# Patient Record
Sex: FEMALE | Race: BLACK OR AFRICAN AMERICAN | Employment: UNEMPLOYED | URBAN - METROPOLITAN AREA
[De-identification: names, ages, dates, MRNs, and addresses within clinical notes are randomized per-mention and may not be internally consistent; named-entity substitution may affect disease eponyms.]

---

## 2017-06-10 ENCOUNTER — HOSPITAL ENCOUNTER (EMERGENCY)
Age: 52
Discharge: HOME | End: 2017-06-11
Attending: EMERGENCY MEDICINE | Admitting: EMERGENCY MEDICINE
Payer: SELF-PAY

## 2017-06-10 ENCOUNTER — APPOINTMENT (OUTPATIENT)
Dept: CT IMAGING | Age: 52
End: 2017-06-10
Attending: EMERGENCY MEDICINE
Payer: SELF-PAY

## 2017-06-10 VITALS
OXYGEN SATURATION: 98 % | HEIGHT: 64 IN | SYSTOLIC BLOOD PRESSURE: 126 MMHG | DIASTOLIC BLOOD PRESSURE: 54 MMHG | RESPIRATION RATE: 16 BRPM | HEART RATE: 96 BPM | TEMPERATURE: 99.3 F | BODY MASS INDEX: 25.61 KG/M2 | WEIGHT: 150 LBS

## 2017-06-10 DIAGNOSIS — K52.9 ENTERITIS: Primary | ICD-10-CM

## 2017-06-10 LAB
ALBUMIN SERPL BCP-MCNC: 4.1 G/DL (ref 3.4–5)
ALBUMIN/GLOB SERPL: 1 {RATIO} (ref 0.8–1.7)
ALP SERPL-CCNC: 97 U/L (ref 45–117)
ALT SERPL-CCNC: 33 U/L (ref 13–56)
ANION GAP BLD CALC-SCNC: 7 MMOL/L (ref 3–18)
APPEARANCE UR: ABNORMAL
AST SERPL W P-5'-P-CCNC: 28 U/L (ref 15–37)
BACTERIA URNS QL MICRO: ABNORMAL /HPF
BASOPHILS # BLD AUTO: 0 K/UL (ref 0–0.06)
BASOPHILS # BLD: 0 % (ref 0–2)
BILIRUB SERPL-MCNC: 0.3 MG/DL (ref 0.2–1)
BILIRUB UR QL: ABNORMAL
BUN SERPL-MCNC: 16 MG/DL (ref 7–18)
BUN/CREAT SERPL: 16 (ref 12–20)
CALCIUM SERPL-MCNC: 9.6 MG/DL (ref 8.5–10.1)
CHLORIDE SERPL-SCNC: 102 MMOL/L (ref 100–108)
CK MB CFR SERPL CALC: NORMAL % (ref 0–4)
CK MB SERPL-MCNC: <1 NG/ML (ref 5–25)
CK SERPL-CCNC: 60 U/L (ref 26–192)
CO2 SERPL-SCNC: 27 MMOL/L (ref 21–32)
COLOR UR: ABNORMAL
CREAT SERPL-MCNC: 0.98 MG/DL (ref 0.6–1.3)
DIFFERENTIAL METHOD BLD: ABNORMAL
EOSINOPHIL # BLD: 0 K/UL (ref 0–0.4)
EOSINOPHIL NFR BLD: 0 % (ref 0–5)
EPITH CASTS URNS QL MICRO: ABNORMAL /LPF (ref 0–5)
ERYTHROCYTE [DISTWIDTH] IN BLOOD BY AUTOMATED COUNT: 14 % (ref 11.6–14.5)
GLOBULIN SER CALC-MCNC: 4.3 G/DL (ref 2–4)
GLUCOSE SERPL-MCNC: 121 MG/DL (ref 74–99)
GLUCOSE UR STRIP.AUTO-MCNC: NEGATIVE MG/DL
HCG UR QL: NEGATIVE
HCT VFR BLD AUTO: 43.3 % (ref 35–45)
HGB BLD-MCNC: 14.1 G/DL (ref 12–16)
HGB UR QL STRIP: ABNORMAL
HYALINE CASTS URNS QL MICRO: ABNORMAL /LPF (ref 0–2)
KETONES UR QL STRIP.AUTO: 15 MG/DL
LEUKOCYTE ESTERASE UR QL STRIP.AUTO: ABNORMAL
LIPASE SERPL-CCNC: 97 U/L (ref 73–393)
LYMPHOCYTES # BLD AUTO: 7 % (ref 21–52)
LYMPHOCYTES # BLD: 0.4 K/UL (ref 0.9–3.6)
MCH RBC QN AUTO: 29.1 PG (ref 24–34)
MCHC RBC AUTO-ENTMCNC: 32.6 G/DL (ref 31–37)
MCV RBC AUTO: 89.3 FL (ref 74–97)
MONOCYTES # BLD: 0.2 K/UL (ref 0.05–1.2)
MONOCYTES NFR BLD AUTO: 4 % (ref 3–10)
MUCOUS THREADS URNS QL MICRO: ABNORMAL /LPF
NEUTS SEG # BLD: 5.1 K/UL (ref 1.8–8)
NEUTS SEG NFR BLD AUTO: 89 % (ref 40–73)
NITRITE UR QL STRIP.AUTO: NEGATIVE
PH UR STRIP: 5 [PH] (ref 5–8)
PLATELET # BLD AUTO: 247 K/UL (ref 135–420)
PMV BLD AUTO: 10.8 FL (ref 9.2–11.8)
POTASSIUM SERPL-SCNC: 3.8 MMOL/L (ref 3.5–5.5)
PROT SERPL-MCNC: 8.4 G/DL (ref 6.4–8.2)
PROT UR STRIP-MCNC: 30 MG/DL
RBC # BLD AUTO: 4.85 M/UL (ref 4.2–5.3)
RBC #/AREA URNS HPF: ABNORMAL /HPF (ref 0–5)
SODIUM SERPL-SCNC: 136 MMOL/L (ref 136–145)
SP GR UR REFRACTOMETRY: >1.03 (ref 1–1.03)
TROPONIN I SERPL-MCNC: <0.02 NG/ML (ref 0–0.06)
UROBILINOGEN UR QL STRIP.AUTO: 1 EU/DL (ref 0.2–1)
WBC # BLD AUTO: 5.7 K/UL (ref 4.6–13.2)
WBC URNS QL MICRO: ABNORMAL /HPF (ref 0–4)

## 2017-06-10 PROCEDURE — 74177 CT ABD & PELVIS W/CONTRAST: CPT

## 2017-06-10 PROCEDURE — 96372 THER/PROPH/DIAG INJ SC/IM: CPT

## 2017-06-10 PROCEDURE — 99284 EMERGENCY DEPT VISIT MOD MDM: CPT

## 2017-06-10 PROCEDURE — 81001 URINALYSIS AUTO W/SCOPE: CPT | Performed by: NURSE PRACTITIONER

## 2017-06-10 PROCEDURE — 85025 COMPLETE CBC W/AUTO DIFF WBC: CPT | Performed by: NURSE PRACTITIONER

## 2017-06-10 PROCEDURE — 96376 TX/PRO/DX INJ SAME DRUG ADON: CPT

## 2017-06-10 PROCEDURE — 82550 ASSAY OF CK (CPK): CPT | Performed by: NURSE PRACTITIONER

## 2017-06-10 PROCEDURE — 80053 COMPREHEN METABOLIC PANEL: CPT | Performed by: NURSE PRACTITIONER

## 2017-06-10 PROCEDURE — 74011636320 HC RX REV CODE- 636/320: Performed by: EMERGENCY MEDICINE

## 2017-06-10 PROCEDURE — 96361 HYDRATE IV INFUSION ADD-ON: CPT

## 2017-06-10 PROCEDURE — 83690 ASSAY OF LIPASE: CPT | Performed by: NURSE PRACTITIONER

## 2017-06-10 PROCEDURE — 81025 URINE PREGNANCY TEST: CPT | Performed by: EMERGENCY MEDICINE

## 2017-06-10 PROCEDURE — 96374 THER/PROPH/DIAG INJ IV PUSH: CPT

## 2017-06-10 PROCEDURE — 74011250636 HC RX REV CODE- 250/636: Performed by: EMERGENCY MEDICINE

## 2017-06-10 PROCEDURE — 93005 ELECTROCARDIOGRAM TRACING: CPT

## 2017-06-10 RX ORDER — DICYCLOMINE HYDROCHLORIDE 20 MG/1
20 TABLET ORAL EVERY 6 HOURS
Qty: 20 TAB | Refills: 0 | Status: SHIPPED | OUTPATIENT
Start: 2017-06-10 | End: 2017-06-15

## 2017-06-10 RX ORDER — ONDANSETRON 2 MG/ML
4 INJECTION INTRAMUSCULAR; INTRAVENOUS
Status: COMPLETED | OUTPATIENT
Start: 2017-06-10 | End: 2017-06-10

## 2017-06-10 RX ORDER — ONDANSETRON 2 MG/ML
4 INJECTION INTRAMUSCULAR; INTRAVENOUS
Status: DISCONTINUED | OUTPATIENT
Start: 2017-06-10 | End: 2017-06-10 | Stop reason: SDUPTHER

## 2017-06-10 RX ORDER — ONDANSETRON 4 MG/1
4 TABLET, FILM COATED ORAL
Qty: 12 TAB | Refills: 0 | Status: SHIPPED | OUTPATIENT
Start: 2017-06-10 | End: 2018-05-07

## 2017-06-10 RX ORDER — DICYCLOMINE HYDROCHLORIDE 10 MG/ML
20 INJECTION INTRAMUSCULAR
Status: COMPLETED | OUTPATIENT
Start: 2017-06-10 | End: 2017-06-10

## 2017-06-10 RX ADMIN — DICYCLOMINE HYDROCHLORIDE 20 MG: 20 INJECTION, SOLUTION INTRAMUSCULAR at 20:24

## 2017-06-10 RX ADMIN — IOPAMIDOL 100 ML: 612 INJECTION, SOLUTION INTRAVENOUS at 22:54

## 2017-06-10 RX ADMIN — ONDANSETRON 4 MG: 2 INJECTION INTRAMUSCULAR; INTRAVENOUS at 20:21

## 2017-06-10 RX ADMIN — SODIUM CHLORIDE 1000 ML: 900 INJECTION, SOLUTION INTRAVENOUS at 22:41

## 2017-06-10 RX ADMIN — ONDANSETRON 4 MG: 2 INJECTION INTRAMUSCULAR; INTRAVENOUS at 23:17

## 2017-06-10 RX ADMIN — SODIUM CHLORIDE 1000 ML: 900 INJECTION, SOLUTION INTRAVENOUS at 20:02

## 2017-06-10 NOTE — ED TRIAGE NOTES
Pt presents to the ED with diarrhea and vomiting onset x1 week. Pt reports black watery stools. Pt states feeling dehydrated. Pt reports increased stress, recent death of a son in November, and death of a parent in December. Pt reports bilateral arm weakness, states chest pain, chills, and \"hot flashes. \"

## 2017-06-10 NOTE — ED PROVIDER NOTES
HPI Comments: 7:34 PM Mahamed Hearn is a 45 y.o. female who presents to the ED for the evaluation of  N/V/D. She also reports generalized myalgias, chills, subjective fever, and intermittent fever for the past week. Pt reports occasional drinking, but no smoking or drug use. Pt denies any new food intake. Denies recent sick contacts. No relieving or exacerbating factors. No other complaints at this time. PMHx: Anemia; Back pain, chronic; Hematuria, microscopic; Kidney stone; and UTI (urinary tract infection). She also has no past medical history of Kidney disease. PCP: Trini Pritchett MD       The history is provided by the patient. Past Medical History:   Diagnosis Date    Anemia     Back pain, chronic     Hematuria, microscopic     Kidney stone     UTI (urinary tract infection)     had one late 2013       Past Surgical History:   Procedure Laterality Date    ENDOMETRIAL CRYOABLATION      HX TONSIL AND ADENOIDECTOMY  1968         Family History:   Problem Relation Age of Onset    Diabetes Mother     Stroke Mother     Hypertension Mother     Diabetes Father     Stroke Father     Hypertension Father     Diabetes Maternal Grandmother     Huntingtons disease Maternal Grandmother     Diabetes Maternal Grandfather     Huntingtons disease Maternal Grandfather        Social History     Social History    Marital status:      Spouse name: N/A    Number of children: N/A    Years of education: N/A     Occupational History    Not on file. Social History Main Topics    Smoking status: Never Smoker    Smokeless tobacco: Not on file    Alcohol use 0.5 oz/week     1 Cans of beer per week      Comment: occ    Drug use: No    Sexual activity: Not on file     Other Topics Concern    Not on file     Social History Narrative    ** Merged History Encounter **              ALLERGIES: Review of patient's allergies indicates no known allergies.     Review of Systems   Constitutional: Positive for appetite change, chills and fever. HENT: Negative. Eyes: Negative. Respiratory: Negative. Cardiovascular: Positive for chest pain. Gastrointestinal: Positive for nausea and vomiting. Endocrine: Negative. Genitourinary: Negative. Musculoskeletal: Positive for myalgias. Skin: Negative. Allergic/Immunologic: Negative. Neurological: Negative. Hematological: Negative. Psychiatric/Behavioral: Negative. All other systems reviewed and are negative. Vitals:    06/10/17 1600 06/10/17 2125 06/10/17 2200 06/10/17 2303   BP: 108/64 103/65  126/54   Pulse: (!) 112 (!) 112  96   Resp: 18 16  16   Temp: 99.1 °F (37.3 °C) 100.4 °F (38 °C)     SpO2: 97% 97% 97% 97%   Weight: 68 kg (150 lb)      Height: 5' 4\" (1.626 m)           97% on RA, indicating adequate oxygenation. Physical Exam   Constitutional: She is oriented to person, place, and time. She appears well-developed and well-nourished. No distress. HENT:   Head: Normocephalic. Right Ear: External ear normal.   Left Ear: External ear normal.   Mouth/Throat: No oropharyngeal exudate. Eyes: Conjunctivae and EOM are normal. Pupils are equal, round, and reactive to light. Right eye exhibits no discharge. Left eye exhibits no discharge. No scleral icterus. Neck: Normal range of motion. Neck supple. No JVD present. No tracheal deviation present. No thyromegaly present. Cardiovascular: Normal rate, regular rhythm, normal heart sounds and intact distal pulses. Exam reveals no gallop and no friction rub. No murmur heard. Pulmonary/Chest: Effort normal and breath sounds normal. No stridor. No respiratory distress. She has no wheezes. She has no rales. She exhibits no tenderness. Abdominal: Soft. Bowel sounds are normal. She exhibits no distension and no mass. There is no tenderness. There is no rebound and no guarding. Musculoskeletal: Normal range of motion. She exhibits no edema or tenderness. Lymphadenopathy:     She has no cervical adenopathy. Neurological: She is alert and oriented to person, place, and time. She displays normal reflexes. No cranial nerve deficit. She exhibits normal muscle tone. Coordination normal.   Skin: Skin is warm and dry. No rash noted. She is not diaphoretic. No erythema. No pallor. Nursing note and vitals reviewed.        MDM  Number of Diagnoses or Management Options  Enteritis:      Amount and/or Complexity of Data Reviewed  Clinical lab tests: ordered and reviewed    Risk of Complications, Morbidity, and/or Mortality  Presenting problems: moderate  Diagnostic procedures: moderate  Management options: moderate      ED Course       Procedures    Medications ordered:   Medications   ondansetron (ZOFRAN) injection 4 mg (not administered)   sodium chloride 0.9 % bolus infusion 1,000 mL (0 mL IntraVENous IV Completed 6/10/17 2102)   ondansetron (ZOFRAN) injection 4 mg (4 mg IntraVENous Given 6/10/17 2021)   dicyclomine (BENTYL) 10 mg/mL injection 20 mg (20 mg IntraMUSCular Given 6/10/17 2024)   sodium chloride 0.9 % bolus infusion 1,000 mL (1,000 mL IntraVENous New Bag 6/10/17 2241)   iopamidol (ISOVUE 300) 61 % contrast injection  mL (100 mL IntraVENous Given 6/10/17 2254)   ondansetron (ZOFRAN) injection 4 mg (4 mg IntraVENous Given 6/10/17 2317)         Lab findings:  Recent Results (from the past 12 hour(s))   EKG, 12 LEAD, INITIAL    Collection Time: 06/10/17  3:30 PM   Result Value Ref Range    Ventricular Rate 103 BPM    Atrial Rate 103 BPM    P-R Interval 120 ms    QRS Duration 88 ms    Q-T Interval 332 ms    QTC Calculation (Bezet) 434 ms    Calculated P Axis 30 degrees    Calculated R Axis 49 degrees    Calculated T Axis 22 degrees    Diagnosis       Sinus tachycardia  Otherwise normal ECG  No previous ECGs available     CBC WITH AUTOMATED DIFF    Collection Time: 06/10/17  7:35 PM   Result Value Ref Range    WBC 5.7 4.6 - 13.2 K/uL    RBC 4.85 4.20 - 5.30 M/uL    HGB 14.1 12.0 - 16.0 g/dL    HCT 43.3 35.0 - 45.0 %    MCV 89.3 74.0 - 97.0 FL    MCH 29.1 24.0 - 34.0 PG    MCHC 32.6 31.0 - 37.0 g/dL    RDW 14.0 11.6 - 14.5 %    PLATELET 022 273 - 431 K/uL    MPV 10.8 9.2 - 11.8 FL    NEUTROPHILS 89 (H) 40 - 73 %    LYMPHOCYTES 7 (L) 21 - 52 %    MONOCYTES 4 3 - 10 %    EOSINOPHILS 0 0 - 5 %    BASOPHILS 0 0 - 2 %    ABS. NEUTROPHILS 5.1 1.8 - 8.0 K/UL    ABS. LYMPHOCYTES 0.4 (L) 0.9 - 3.6 K/UL    ABS. MONOCYTES 0.2 0.05 - 1.2 K/UL    ABS. EOSINOPHILS 0.0 0.0 - 0.4 K/UL    ABS. BASOPHILS 0.0 0.0 - 0.06 K/UL    DF AUTOMATED     METABOLIC PANEL, COMPREHENSIVE    Collection Time: 06/10/17  7:35 PM   Result Value Ref Range    Sodium 136 136 - 145 mmol/L    Potassium 3.8 3.5 - 5.5 mmol/L    Chloride 102 100 - 108 mmol/L    CO2 27 21 - 32 mmol/L    Anion gap 7 3.0 - 18 mmol/L    Glucose 121 (H) 74 - 99 mg/dL    BUN 16 7.0 - 18 MG/DL    Creatinine 0.98 0.6 - 1.3 MG/DL    BUN/Creatinine ratio 16 12 - 20      GFR est AA >60 >60 ml/min/1.73m2    GFR est non-AA 60 (L) >60 ml/min/1.73m2    Calcium 9.6 8.5 - 10.1 MG/DL    Bilirubin, total 0.3 0.2 - 1.0 MG/DL    ALT (SGPT) 33 13 - 56 U/L    AST (SGOT) 28 15 - 37 U/L    Alk.  phosphatase 97 45 - 117 U/L    Protein, total 8.4 (H) 6.4 - 8.2 g/dL    Albumin 4.1 3.4 - 5.0 g/dL    Globulin 4.3 (H) 2.0 - 4.0 g/dL    A-G Ratio 1.0 0.8 - 1.7     LIPASE    Collection Time: 06/10/17  7:35 PM   Result Value Ref Range    Lipase 97 73 - 393 U/L   CARDIAC PANEL,(CK, CKMB & TROPONIN)    Collection Time: 06/10/17  7:35 PM   Result Value Ref Range    CK 60 26 - 192 U/L    CK - MB <1.0 <3.6 ng/ml    CK-MB Index Cannot be calulated 0.0 - 4.0 %    Troponin-I, Qt. <0.02 0.00 - 0.06 NG/ML   URINALYSIS W/ RFLX MICROSCOPIC    Collection Time: 06/10/17  7:35 PM   Result Value Ref Range    Color DARK YELLOW      Appearance CLOUDY      Specific gravity >1.030 (H) 1.005 - 1.030    pH (UA) 5.0 5.0 - 8.0      Protein 30 (A) NEG mg/dL    Glucose NEGATIVE  NEG mg/dL Ketone 15 (A) NEG mg/dL    Bilirubin MODERATE (A) NEG      Blood TRACE (A) NEG      Urobilinogen 1.0 0.2 - 1.0 EU/dL    Nitrites NEGATIVE  NEG      Leukocyte Esterase TRACE (A) NEG     URINE MICROSCOPIC ONLY    Collection Time: 06/10/17  7:35 PM   Result Value Ref Range    WBC 0 to 3 0 - 4 /hpf    RBC 0 to 3 0 - 5 /hpf    Epithelial cells 1+ 0 - 5 /lpf    Bacteria FEW (A) NEG /hpf    Mucus 2+ (A) NEG /lpf    Hyaline cast 11 to 20 0 - 2 /lpf   HCG URINE, QL    Collection Time: 06/10/17  7:35 PM   Result Value Ref Range    HCG urine, Ql. NEGATIVE  NEG          EKG interpretation per Drenda Castleman, MD :  Sinus tachycardia rate of 103. o STEMI    X-Ray, CT or other radiology findings or impressions:  Ct Abd Pelv W Cont    Result Date: 6/10/2017  CT ABDOMEN/PELVIS WITH CONTRAST CPT CODE: 31068,05552 HISTORY: Diarrhea and vomiting for one week. COMPARISON: None. TECHNIQUE: 5 mm helical scans were obtained of the abdomen and pelvis after uneventful administration of intravenous contrast. 100 cc of Isovue-300 was given. Coronal and sagittal reformations. CT dose reduction was achieved through use of a standardized protocol tailored for this examination and automatic exposure control for dose modulation. Vernida Chard FINDINGS: The visualized lung bases are clear. Low-attenuation focus at the central left liver on image 14 measures 1.2 x 0.6 cm. No other focal liver lesion identified. Homogeneous enhancement of the spleen. Homogeneous enhancement of the pancreas. Gallbladder is nondilated. No biliary duct dilatation. .  No adrenal nodules or masses. The kidneys enhance symmetrically. Hypodensity in the posterior mid right kidney medially measures 4.7 mm, too small to definitively characterize but favored to be a cyst given its conspicuity. .  No hydronephrosis. There is no free intraperitoneal air, free fluid, or fluid collections. No abdominal or pelvic lymphadenopathy. Stomach is nondilated.  Fluid throughout the small bowel and the right and transverse colon. No wall thickening. No pneumatosis. No portal venous gas. No bowel dilatation. The appendix does not appear inflamed. The bladder is under distended and therefore incompletely evaluated. Uterus and ovaries appear within normal limits, no adnexal mass. No calcifications in the abdominal aorta. No aneurysmal dilatation. Osseous structures are intact. IMPRESSION: Fluid filled small bowel loops and proximal to mid right colon. No associated dilatation to suggest obstruction. Findings may represent a nonspecific enteritis and correlate with reported history of diarrhea. Low-attenuation focus in the left liver incompletely characterized on this study. Most likely incidental and benign particularly if there is no history of any known malignancy. Consider a outpatient ultrasound for correlation. Probable small cyst in the right kidney which also could be confirmed on ultrasound. Progress notes, Consult notes or additional Procedure notes:   11:39 PM: Rechecked patient. Updated patient on all ED findings. All questions answered. Reevaluation of patient:   I have reevaluated patient. Patient is feeling better    Dispo:  Patient was discharged home in stable condition. Patient is to return to emergency department with any new or worsening condition. Diagnosis:   1. Enteritis        Follow-up Information     Follow up With Details Comments Contact Info    Walter Kauffman MD Call in 2 days      [facility] EMERGENCY DEPT  As needed, If symptoms worsen [address] [facility]  [phone]           Patient's Medications   Start Taking    No medications on file   Continue Taking    CYCLOBENZAPRINE (FLEXERIL) 10 MG TABLET    Take  by mouth three (3) times daily as needed for Muscle Spasm(s). DIAZEPAM (VALIUM) 10 MG TABLET    Take 10 mg by mouth every six (6) hours as needed for Anxiety. HYDROCODONE BIT/ACETAMINOPHEN (VICODIN PO)    Take  by mouth. LORATADINE (CLARITIN) 10 MG TABLET    Take 10 mg by mouth daily. NAPROXEN (NAPROSYN) 500 MG TABLET    Take 500 mg by mouth two (2) times daily (with meals). TRAMADOL (ULTRAM) 50 MG TABLET    Take 50 mg by mouth every six (6) hours as needed for Pain. These Medications have changed    No medications on file   Stop Taking    No medications on file         SCRIBE ATTESTATION STATEMENT  Documented by: Beverley Berrios. [facility] for, and in the presence of, Chris Laureano MD 7:33 PM   Signed by Tabatha Sneed, 6/10/2017 7:33 PM     PROVIDER ATTESTATION STATEMENT  I personally performed the services described in the documentation, reviewed the documentation, as recorded by the scribe in my presence, and it accurately and completely records my words and actions.   Chris Laureano MD

## 2017-06-11 NOTE — ED NOTES
Pt resting comfortably in bed; calm/drowsy affect. Reports discomfort has decreased to 1/10 with no nausea. Elevated temperature noted with ongoing tachycardia; Dr Gray Ruiz notified. Education given to pt regarding importance of hydration/electrolyte replacement after watery stools, and clear liquids.

## 2017-06-12 LAB
ATRIAL RATE: 103 BPM
CALCULATED P AXIS, ECG09: 30 DEGREES
CALCULATED R AXIS, ECG10: 49 DEGREES
CALCULATED T AXIS, ECG11: 22 DEGREES
DIAGNOSIS, 93000: NORMAL
P-R INTERVAL, ECG05: 120 MS
Q-T INTERVAL, ECG07: 332 MS
QRS DURATION, ECG06: 88 MS
QTC CALCULATION (BEZET), ECG08: 434 MS
VENTRICULAR RATE, ECG03: 103 BPM

## 2018-05-07 ENCOUNTER — HOSPITAL ENCOUNTER (EMERGENCY)
Age: 53
Discharge: HOME | End: 2018-05-07
Attending: EMERGENCY MEDICINE
Payer: SELF-PAY

## 2018-05-07 VITALS
DIASTOLIC BLOOD PRESSURE: 79 MMHG | OXYGEN SATURATION: 100 % | BODY MASS INDEX: 27.46 KG/M2 | TEMPERATURE: 98.8 F | SYSTOLIC BLOOD PRESSURE: 126 MMHG | HEART RATE: 83 BPM | WEIGHT: 160 LBS | RESPIRATION RATE: 16 BRPM

## 2018-05-07 DIAGNOSIS — J02.9 SORE THROAT: Primary | ICD-10-CM

## 2018-05-07 PROCEDURE — 99282 EMERGENCY DEPT VISIT SF MDM: CPT

## 2018-05-07 PROCEDURE — 87081 CULTURE SCREEN ONLY: CPT | Performed by: PHYSICIAN ASSISTANT

## 2018-05-07 RX ORDER — FLUTICASONE PROPIONATE 50 MCG
2 SPRAY, SUSPENSION (ML) NASAL DAILY
Qty: 1 BOTTLE | Refills: 0 | Status: SHIPPED | OUTPATIENT
Start: 2018-05-07

## 2018-05-07 RX ORDER — LORATADINE AND PSEUDOEPHEDRINE 10; 240 MG/1; MG/1
1 TABLET, EXTENDED RELEASE ORAL DAILY
Qty: 10 TAB | Refills: 0 | Status: SHIPPED | OUTPATIENT
Start: 2018-05-07

## 2018-05-08 NOTE — ED PROVIDER NOTES
EMERGENCY DEPARTMENT HISTORY AND PHYSICAL EXAM    11:17 PM      Date: 5/7/2018  Patient Name: Selam Klein    History of Presenting Illness     Chief Complaint   Patient presents with    Sore Throat         History Provided By: Patient    Chief Complaint: sore throat   Duration: 3  Days  Timing:  Acute  Location:    Quality: Burning  Severity: Moderate  Modifying Factors:  Swallowing   Associated Symptoms: rhinorrhea and congestion       Additional History (Context): Selam Klein is a 47 y.o. female with No significant past medical history who presents with rhinorrhea, sore throat, congestion. [facility]CP: [facility]    Current Outpatient Prescriptions   Medication Sig Dispense Refill    loratadine-pseudoephedrine (CLARITIN-D 24 HOUR)  mg per tablet Take 1 Tab by mouth daily. 10 Tab 0    fluticasone (FLONASE) 50 mcg/actuation nasal spray 2 Sprays by Both Nostrils route daily. 1 Bottle 0    diazepam (VALIUM) 10 mg tablet Take 10 mg by mouth every six (6) hours as needed for Anxiety.          Past History     Past Medical History:  Past Medical History:   Diagnosis Date    Anemia     Back pain, chronic     Hematuria, microscopic     Kidney stone     UTI (urinary tract infection)     had one late 2013       Past Surgical History:  Past Surgical History:   Procedure Laterality Date    ENDOMETRIAL CRYOABLATION      HX TONSIL AND ADENOIDECTOMY  1968       Family History:  Family History   Problem Relation Age of Onset    Diabetes Mother     Stroke Mother     Hypertension Mother     Diabetes Father     Stroke Father     Hypertension Father     Diabetes Maternal Grandmother     Huntingtons disease Maternal Grandmother     Diabetes Maternal Grandfather     Huntingtons disease Maternal Grandfather        Social History:  Social History   Substance Use Topics    Smoking status: Never Smoker    Smokeless tobacco: None    Alcohol use 0.5 oz/week     1 Cans of beer per week      Comment: occ Allergies:  No Known Allergies      Review of Systems       Review of Systems   Constitutional: Negative for fever. HENT: Positive for congestion, rhinorrhea and sore throat. Negative for facial swelling. Eyes: Negative for visual disturbance. Respiratory: Negative for shortness of breath. Cardiovascular: Negative for chest pain. Gastrointestinal: Negative for abdominal pain. Genitourinary: Negative for dysuria. Musculoskeletal: Negative for neck pain. Skin: Negative for rash. Neurological: Negative for dizziness. Psychiatric/Behavioral: Negative for confusion. All other systems reviewed and are negative. Physical Exam     Visit Vitals    /79 (BP 1 Location: Left arm, BP Patient Position: At rest)    Pulse 83    Temp 98.8 °F (37.1 °C)    Resp 16    Wt 72.6 kg (160 lb)    SpO2 100%    BMI 27.46 kg/m2         Physical Exam   Constitutional: She is oriented to person, place, and time. She appears well-developed and well-nourished. No distress. HENT:   Head: Normocephalic and atraumatic. Right Ear: Tympanic membrane, external ear and ear canal normal.   Left Ear: Tympanic membrane, external ear and ear canal normal.   Nose: Nose normal. Right sinus exhibits no maxillary sinus tenderness and no frontal sinus tenderness. Left sinus exhibits no maxillary sinus tenderness and no frontal sinus tenderness. Mouth/Throat: Uvula is midline, oropharynx is clear and moist and mucous membranes are normal. No oropharyngeal exudate, posterior oropharyngeal edema, posterior oropharyngeal erythema or tonsillar abscesses. Eyes: Conjunctivae are normal.   Neck: Normal range of motion. Neck supple. Cardiovascular: Normal rate, regular rhythm and normal heart sounds. Pulmonary/Chest: Effort normal and breath sounds normal.   Abdominal: She exhibits no distension. Musculoskeletal: Normal range of motion. Lymphadenopathy:     She has no cervical adenopathy.    Neurological: She is alert and oriented to person, place, and time. Skin: Skin is warm and dry. She is not diaphoretic. Psychiatric: She has a normal mood and affect. Nursing note and vitals reviewed. Diagnostic Study Results     Labs -  Recent Results (from the past 12 hour(s))   STREP THROAT SCREEN    Collection Time: 05/07/18 10:24 PM   Result Value Ref Range    Special Requests: NO SPECIAL REQUESTS      Strep Screen NEGATIVE       Culture result: PENDING        Radiologic Studies -   No orders to display         Medical Decision Making   I am the first provider for this patient. I reviewed the vital signs, available nursing notes, past medical history, past surgical history, family history and social history. Vital Signs-Reviewed the patient's vital signs. Records Reviewed: Nursing Notes (Time of Review: 11:17 PM)    ED Course: Progress Notes, Reevaluation, and Consults:      Provider Notes (Medical Decision Making): MDM  Number of Diagnoses or Management Options  Sore throat:   Diagnosis management comments: Sore throat. ENT exam normal.  Strep negative. Likely PND. Discussed treatment plan, return precautions, symptomatic relief, and expected time to improvement. All questions answered. Patient is stable for discharge and outpatient management. Diagnosis     Clinical Impression:   1. Sore throat        Disposition:     Follow-up Information     Follow up With Details Comments [address] In 3 days If symptoms do not improve [address] [facility]. [phone]  [address] EMERGENCY DEPT  Immediately if symptoms worsen [address] [address]  [phone]           Patient's Medications   Start Taking    FLUTICASONE (FLONASE) 50 MCG/ACTUATION NASAL SPRAY    2 Sprays by Both Nostrils route daily. LORATADINE-PSEUDOEPHEDRINE (CLARITIN-D 24 HOUR)  MG PER TABLET    Take 1 Tab by mouth daily. Continue Taking    DIAZEPAM (VALIUM) 10 MG TABLET    Take 10 mg by mouth every six (6) hours as needed for Anxiety. These Medications have changed    No medications on file   Stop Taking    CYCLOBENZAPRINE (FLEXERIL) 10 MG TABLET    Take  by mouth three (3) times daily as needed for Muscle Spasm(s). HYDROCODONE BIT/ACETAMINOPHEN (VICODIN PO)    Take  by mouth. LORATADINE (CLARITIN) 10 MG TABLET    Take 10 mg by mouth daily. NAPROXEN (NAPROSYN) 500 MG TABLET    Take 500 mg by mouth two (2) times daily (with meals). ONDANSETRON HCL (ZOFRAN, AS HYDROCHLORIDE,) 4 MG TABLET    Take 1 Tab by mouth every eight (8) hours as needed for Nausea. TRAMADOL (ULTRAM) 50 MG TABLET    Take 50 mg by mouth every six (6) hours as needed for Pain.     _______________________________    Attestations:  Scribe Attestation     Liang KYLAH Day PA-C acting as a scribe for and in the presence of MARKO Villa      May 07, 2018 at 11:20 PM       Provider Attestation:      I personally performed the services described in the documentation, reviewed the documentation, as recorded by the scribe in my presence, and it accurately and completely records my words and actions.  May 07, 2018 at 11:20 PM - MARKO Villa  _______________________________

## 2018-05-08 NOTE — DISCHARGE INSTRUCTIONS
Sore Throat: Care Instructions  Your Care Instructions    Infection by bacteria or a virus causes most sore throats. Cigarette smoke, dry air, air pollution, allergies, and yelling can also cause a sore throat. Sore throats can be painful and annoying. Fortunately, most sore throats go away on their own. If you have a bacterial infection, your doctor may prescribe antibiotics. Follow-up care is a key part of your treatment and safety. Be sure to make and go to all appointments, and call your doctor if you are having problems. It's also a good idea to know your test results and keep a list of the medicines you take. How can you care for yourself at home? · If your doctor prescribed antibiotics, take them as directed. Do not stop taking them just because you feel better. You need to take the full course of antibiotics. · Gargle with warm salt water once an hour to help reduce swelling and relieve discomfort. Use 1 teaspoon of salt mixed in 1 cup of warm water. · Take an over-the-counter pain medicine, such as acetaminophen (Tylenol), ibuprofen (Advil, Motrin), or naproxen (Aleve). Read and follow all instructions on the label. · Be careful when taking over-the-counter cold or flu medicines and Tylenol at the same time. Many of these medicines have acetaminophen, which is Tylenol. Read the labels to make sure that you are not taking more than the recommended dose. Too much acetaminophen (Tylenol) can be harmful. · Drink plenty of fluids. Fluids may help soothe an irritated throat. Hot fluids, such as tea or soup, may help decrease throat pain. · Use over-the-counter throat lozenges to soothe pain. Regular cough drops or hard candy may also help. These should not be given to young children because of the risk of choking. · Do not smoke or allow others to smoke around you. If you need help quitting, talk to your doctor about stop-smoking programs and medicines.  These can increase your chances of quitting for good. · Use a vaporizer or humidifier to add moisture to your bedroom. Follow the directions for cleaning the machine. When should you call for help? Call your doctor now or seek immediate medical care if:  ? · You have new or worse trouble swallowing. ? · Your sore throat gets much worse on one side. ? Watch closely for changes in your health, and be sure to contact your doctor if you do not get better as expected. Where can you learn more? Go to http://kuldeep-isaiah.info/. Enter [id number] [id number] in the search box to learn more about \"Sore Throat: Care Instructions. \"  Current as of: May 12, 2017  Content Version: 11.4  © 9548-5076 [employer]. Care instructions adapted under license by [employer] (which disclaims liability or warranty for this information). If you have questions about a medical condition or this instruction, always ask your healthcare professional. [place] [address] any warranty or liability for your use of this information. Rapid Strep Test: About This Test  What is it? A rapid strep test checks the bacteria in your throat to see if strep is the cause of your sore throat. Why is this test done? It may be done so your doctor can find out right away whether you have strep throat. There is another test for strep, called a throat culture, but that test takes a few days to get the results. How can you prepare for the test?  You don't need to do anything before you have this test.  What happens during the test?  · You will be asked to tilt your head back and open your mouth as wide as possible. · Your doctor will press your tongue down with a flat stick (tongue depressor) and then examine your mouth and throat. · A clean cotton swab will be rubbed over the back of your throat, around your tonsils, and over any red areas or sores to collect a sample. How long does the test take? · The test takes less than a minute.   · Results are available in 10 to 15 minutes. Follow-up care is a key part of your treatment and safety. Be sure to make and go to all appointments, and call your doctor if you are having problems. It's also a good idea to keep a list of the medicines you take. Ask your doctor when you can expect to have your test results. Where can you learn more? Go to http://kuldeep-isaiah.info/. Enter B356 in the search box to learn more about \"Rapid Strep Test: About This Test.\"  Current as of: May 12, 2017  Content Version: 11.4  © 0028-9391 [employer]. Care instructions adapted under license by [employer] (which disclaims liability or warranty for this information). If you have questions about a medical condition or this instruction, always ask your healthcare professional. [address] any warranty or liability for your use of this information.

## 2018-05-10 LAB
B-HEM STREP THROAT QL CULT: NEGATIVE
BACTERIA SPEC CULT: NORMAL
SERVICE CMNT-IMP: NORMAL

## 2019-01-29 ENCOUNTER — OFFICE VISIT (OUTPATIENT)
Dept: FAMILY MEDICINE CLINIC | Age: 54
End: 2019-01-29

## 2019-01-29 VITALS
WEIGHT: 165 LBS | OXYGEN SATURATION: 95 % | BODY MASS INDEX: 28.17 KG/M2 | SYSTOLIC BLOOD PRESSURE: 114 MMHG | HEART RATE: 78 BPM | TEMPERATURE: 98.1 F | HEIGHT: 64 IN | DIASTOLIC BLOOD PRESSURE: 80 MMHG | RESPIRATION RATE: 12 BRPM

## 2019-01-29 DIAGNOSIS — M75.81 ROTATOR CUFF TENDONITIS, RIGHT: ICD-10-CM

## 2019-01-29 DIAGNOSIS — M25.521 ELBOW PAIN, RIGHT: Primary | ICD-10-CM

## 2019-01-29 DIAGNOSIS — M54.2 CHRONIC NECK PAIN: ICD-10-CM

## 2019-01-29 DIAGNOSIS — G89.29 CHRONIC NECK PAIN: ICD-10-CM

## 2019-01-29 RX ORDER — NAPROXEN 500 MG/1
500 TABLET ORAL 2 TIMES DAILY WITH MEALS
Qty: 60 TAB | Refills: 1 | Status: SHIPPED | OUTPATIENT
Start: 2019-01-29
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
## 2019-01-29 NOTE — PATIENT INSTRUCTIONS
Tennis Elbow: Exercises Your Care Instructions Here are some examples of typical rehabilitation exercises for your condition. Start each exercise slowly. Ease off the exercise if you start to have pain. Your doctor or physical therapist will tell you when you can start these exercises and which ones will work best for you. How to do the exercises Wrist flexor stretch 1. Extend your arm in front of you with your palm up. 2. Bend your wrist, pointing your hand toward the floor. 3. With your other hand, gently bend your wrist farther until you feel a mild to moderate stretch in your forearm. 4. Hold for at least 15 to 30 seconds. Repeat 2 to 4 times. Wrist extensor stretch 1. Repeat steps 1 to 4 of the stretch above but begin with your extended hand palm down. Ball or sock squeeze 1. Hold a tennis ball (or a rolled-up sock) in your hand. 2. Make a fist around the ball (or sock) and squeeze. 3. Hold for about 6 seconds, and then relax for up to 10 seconds. 4. Repeat 8 to 12 times. 5. Switch the ball (or sock) to your other hand and do 8 to 12 times. Wrist deviation 1. Sit so that your arm is supported but your hand hangs off the edge of a flat surface, such as a table. 2. Hold your hand out like you are shaking hands with someone. 3. Move your hand up and down. 4. Repeat this motion 8 to 12 times. 5. Switch arms. 6. Try to do this exercise twice with each hand. Wrist curls 1. Place your forearm on a table with your hand hanging over the edge of the table, palm up. 2. Place a 1- to 2-pound weight in your hand. This may be a dumbbell, a can of food, or a filled water bottle. 3. Slowly raise and lower the weight while keeping your forearm on the table and your palm facing up. 4. Repeat this motion 8 to 12 times. 5. Switch arms, and do steps 1 through 4. 
6. Repeat with your hand facing down toward the floor. Switch arms. Biceps curls 1. Sit leaning forward with your legs slightly spread and your left hand on your left thigh. 2. Place your right elbow on your right thigh, and hold the weight with your forearm horizontal. 
3. Slowly curl the weight up and toward your chest. 
4. Repeat this motion 8 to 12 times. 5. Switch arms, and do steps 1 through 4. Follow-up care is a key part of your treatment and safety. Be sure to make and go to all appointments, and call your doctor if you are having problems. It's also a good idea to know your test results and keep a list of the medicines you take. Where can you learn more? Go to http://kuldeep-isaiah.info/. Enter Q955 in the search box to learn more about \"Tennis Elbow: Exercises. \" Current as of: September 20, 2018 Content Version: 11.9 © 1593-6707 [employer]. Care instructions adapted under license by [employer] (which disclaims liability or warranty for this information). If you have questions about a medical condition or this instruction, always ask your healthcare professional. [address] any warranty or liability for your use of this information. Rotator Cuff: Exercises Your Care Instructions Here are some examples of typical rehabilitation exercises for your condition. Start each exercise slowly. Ease off the exercise if you start to have pain. Your doctor or physical therapist will tell you when you can start these exercises and which ones will work best for you. How to do the exercises Pendulum swing 1. Hold on to a table or the back of a chair with your good arm. Then bend forward a little and let your sore arm hang straight down. This exercise does not use the arm muscles. Rather, use your legs and your hips to create movement that makes your arm swing freely.  
2. Use the movement from your hips and legs to guide the slightly swinging arm back and forth like a pendulum (or elephant trunk). Then guide it in circles that start small (about the size of a dinner plate). Make the circles a bit larger each day, as your pain allows. 3. Do this exercise for 5 minutes, 5 to 7 times each day. 4. As you have less pain, try bending over a little farther to do this exercise. This will increase the amount of movement at your shoulder. Posterior stretching exercise 1. Hold the elbow of your injured arm with your other hand. 2. Use your hand to pull your injured arm gently up and across your body. You will feel a gentle stretch across the back of your injured shoulder. 3. Hold for at least 15 to 30 seconds. Then slowly lower your arm. 4. Repeat 2 to 4 times. Up-the-back stretch 1. Put your hand in your back pocket. Let it rest there to stretch your shoulder. 2. With your other hand, hold your injured arm (palm outward) behind your back by the wrist. Pull your arm up gently to stretch your shoulder. 3. Next, put a towel over your other shoulder. Put the hand of your injured arm behind your back. Now hold the back end of the towel. With the other hand, hold the front end of the towel in front of your body. Pull gently on the front end of the towel. This will bring your hand farther up your back to stretch your shoulder. Overhead stretch 1. Standing about an arm's length away, grasp onto a solid surface. You could use a countertop, a doorknob, or the back of a sturdy chair. 2. With your knees slightly bent, bend forward with your arms straight. Lower your upper body, and let your shoulders stretch. 3. As your shoulders are able to stretch farther, you may need to take a step or two backward. 4. Hold for at least 15 to 30 seconds. Then stand up and relax. If you had stepped back during your stretch, step forward so you can keep your hands on the solid surface. 5. Repeat 2 to 4 times. Shoulder flexion (lying down) 1. Lie on your back, holding a wand with both hands. Your palms should face down as you hold the wand. 2. Keeping your elbows straight, slowly raise your arms over your head. Raise them until you feel a stretch in your shoulders, upper back, and chest. 
3. Hold for 15 to 30 seconds. 4. Repeat 2 to 4 times. Shoulder rotation (lying down) 1. Lie on your back. Hold a wand with both hands with your elbows bent and palms up. 2. Keep your elbows close to your body, and move the wand across your body toward the sore arm. 3. Hold for 8 to 12 seconds. 4. Repeat 2 to 4 times. Wall climbing (to the side) 1. Stand with your side to a wall so that your fingers can just touch it at an angle about 30 degrees toward the front of your body. 2. Walk the fingers of your injured arm up the wall as high as pain permits. Try not to shrug your shoulder up toward your ear as you move your arm up. 3. Hold that position for a count of at least 15 to 20. 
4. Walk your fingers back down to the starting position. 5. Repeat at least 2 to 4 times. Try to reach higher each time. Wall climbing (to the front) 1. Face a wall, and stand so your fingers can just touch it. 2. Keeping your shoulder down, walk the fingers of your injured arm up the wall as high as pain permits. (Don't shrug your shoulder up toward your ear.) 3. Hold your arm in that position for at least 15 to 30 seconds. 4. Slowly walk your fingers back down to where you started. 5. Repeat at least 2 to 4 times. Try to reach higher each time. Shoulder blade squeeze 1. Stand with your arms at your sides, and squeeze your shoulder blades together. Do not raise your shoulders up as you squeeze. 2. Hold 6 seconds. 3. Repeat 8 to 12 times. Scapular exercise: Arm reach 1. Lie flat on your back. This exercise is a very slight motion that starts with your arms raised (elbows straight, arms straight). 2. From this position, reach higher toward the willi or ceiling. Keep your elbows straight. All motion should be from your shoulder blade only. 3. Relax your arms back to where you started. 4. Repeat 8 to 12 times. Arm raise to the side 1. Slowly raise your injured arm to the side, with your thumb facing up. Raise your arm 60 degrees at the most (shoulder level is 90 degrees). 2. Hold the position for 3 to 5 seconds. Then lower your arm back to your side. If you need to, bring your \"good\" arm across your body and place it under the elbow as you lower your injured arm. Use your good arm to keep your injured arm from dropping down too fast. 
3. Repeat 8 to 12 times. 4. When you first start out, don't hold any extra weight in your hand. As you get stronger, you may use a 1-pound to 2-pound dumbbell or a small can of food. Shoulder flexor and extensor exercise 1. Push forward (flex): Stand facing a wall or doorjamb, about 6 inches or less back. Hold your injured arm against your body. Make a closed fist with your thumb on top. Then gently push your hand forward into the wall with about 25% to 50% of your strength. Don't let your body move backward as you push. Hold for about 6 seconds. Relax for a few seconds. Repeat 8 to 12 times. 2. Push backward (extend): Stand with your back flat against a wall. Your upper arm should be against the wall, with your elbow bent 90 degrees (your hand straight ahead). Push your elbow gently back against the wall with about 25% to 50% of your strength. Don't let your body move forward as you push. Hold for about 6 seconds. Relax for a few seconds. Repeat 8 to 12 times. Scapular exercise: Wall push-ups 1. Stand facing a wall, about 12 inches to 18 inches away. 2. Place your hands on the wall at shoulder height. 3. Slowly bend your elbows and bring your face to the wall. Keep your back and hips straight. 4. Push back to where you started. 5. Repeat 8 to 12 times. 6. When you can do this exercise against a wall comfortably, you can try it against a counter. You can then slowly progress to the end of a couch, then to a sturdy chair, and finally to the floor. Scapular exercise: Retraction 1. Put the band around a solid object at about waist level. (A bedpost will work well.) Each hand should hold an end of the band. 2. With your elbows at your sides and bent to 90 degrees, pull the band back. Your shoulder blades should move toward each other. Then move your arms back where you started. 3. Repeat 8 to 12 times. 4. If you have good range of motion in your shoulders, try this exercise with your arms lifted out to the sides. Keep your elbows at a 90-degree angle. Raise the elastic band up to about shoulder level. Pull the band back to move your shoulder blades toward each other. Then move your arms back where you started. Internal rotator strengthening exercise 1. Start by tying a piece of elastic exercise material to a doorknob. You can use surgical tubing or Thera-Band. 2. Stand or sit with your shoulder relaxed and your elbow bent 90 degrees. Your upper arm should rest comfortably against your side. Squeeze a rolled towel between your elbow and your body for comfort. This will help keep your arm at your side. 3. Hold one end of the elastic band in the hand of the painful arm. 4. Slowly rotate your forearm toward your body until it touches your belly. Slowly move it back to where you started. 5. Keep your elbow and upper arm firmly tucked against the towel roll or at your side. 6. Repeat 8 to 12 times. External rotator strengthening exercise 1. Start by tying a piece of elastic exercise material to a doorknob. You can use surgical tubing or Thera-Band. (You may also hold one end of the band in each hand.) 2. Stand or sit with your shoulder relaxed and your elbow bent 90 degrees. Your upper arm should rest comfortably against your side.  Squeeze a rolled towel between your elbow and your body for comfort. This will help keep your arm at your side. 3. Hold one end of the elastic band with the hand of the painful arm. 4. Start with your forearm across your belly. Slowly rotate the forearm out away from your body. Keep your elbow and upper arm tucked against the towel roll or the side of your body until you begin to feel tightness in your shoulder. Slowly move your arm back to where you started. 5. Repeat 8 to 12 times. Follow-up care is a key part of your treatment and safety. Be sure to make and go to all appointments, and call your doctor if you are having problems. It's also a good idea to know your test results and keep a list of the medicines you take. Where can you learn more? Go to http://kuldeep-isaiah.info/. Enter Sena Willis in the search box to learn more about \"Rotator Cuff: Exercises. \" Current as of: September 20, 2018 Content Version: 11.9 © 8771-2429 [employer]. Care instructions adapted under license by [employer] (which disclaims liability or warranty for this information). If you have questions about a medical condition or this instruction, always ask your healthcare professional. [place] [address] any warranty or liability for your use of this information. As needed  You may schedule with: [facility] [address] [place] [address] [phone] [facility] [place] [address] Phone : [phone] Please complete BS Financial Assistance Form Well Woman Exam 2/5/19 @12:45PM

## 2019-01-29 NOTE — PROGRESS NOTES
Chief Complaint Patient presents with  Hypertension 1. When and where did you last receive medical care? Yes Where: [facility] 2. When and where did you last have preventive care such as mammogram, pap smears or colon screening?no 3. What is your current living situation (for example, live alone, live in home with immediate family members)? yes 4. Do you have any problems with communication such trouble seeing, hearing, or understanding instructions? No 
 
5. Do you have an advance directive? This is a document that you can give to family members with instructions for how you would want them to make health care decisions for you if you were unable to speak for yourself. (For example, unconscious, delerious)No 
 
PMH/FH/Social Hx reviewed and updated as needed Applicable screenings reviewed and updated as needed Medication reconciliation performed. Patient does not need medication refills. Health Maintenance reviewed.

## 2019-01-29 NOTE — PROGRESS NOTES
Patient scheduled for Well Woman Exam- Patient to enroll in [facility] - understands MAMMO means a  must drive to [place]. Patient given AVS with exercises to complete in- home Patient given [facility] Contact Info and BS Financial Assistance Application for PT to pursue PRN. Guero Astudillo Discharge instructions reviewed with patient. Patient given AVS  Containing [facility] Contact Information, Medication list and understanding of medications reviewed with patient. OTC and herbal medications reviewed and added to med list if applicable. Barriers to adherence assessed. Guidance given regarding new medication this visit, including reason for taking this medicine and common side effects.

## 2019-02-05 ENCOUNTER — HOSPITAL ENCOUNTER (OUTPATIENT)
Dept: LAB | Age: 54
Discharge: HOME | End: 2019-02-05

## 2019-02-05 ENCOUNTER — OFFICE VISIT (OUTPATIENT)
Dept: FAMILY MEDICINE CLINIC | Age: 54
End: 2019-02-05

## 2019-02-05 VITALS
TEMPERATURE: 97 F | WEIGHT: 167 LBS | RESPIRATION RATE: 16 BRPM | HEART RATE: 75 BPM | BODY MASS INDEX: 29.59 KG/M2 | HEIGHT: 63 IN | OXYGEN SATURATION: 97 % | DIASTOLIC BLOOD PRESSURE: 78 MMHG | SYSTOLIC BLOOD PRESSURE: 111 MMHG

## 2019-02-05 DIAGNOSIS — Z12.4 SCREENING FOR CERVICAL CANCER: ICD-10-CM

## 2019-02-05 DIAGNOSIS — Z12.39 SCREENING FOR BREAST CANCER: Primary | ICD-10-CM

## 2019-02-05 PROCEDURE — 88142 CYTOPATH C/V THIN LAYER: CPT

## 2019-02-05 NOTE — PROGRESS NOTES
1. Have you been to the ER, urgent care clinic since your last visit? Hospitalized since your last visit? No 
 
2. Have you seen or consulted any other health care providers outside of the [address] [place] [address] since your last visit? Include any pap smears or colon screening. No 
 
Patient enrolling in [facility] today.

## 2019-02-05 NOTE — PROGRESS NOTES
HPI 
Nohelia Clarke is a 46 y.o. female being seen today for Chief Complaint Patient presents with  Well Woman Ana Woodard she states that her last pap smear was over 3 years ago. Last mammogram was last year (normal) she is here for WWE. Checks her own breasts someitmes and has no concerns for new lump or other changes. Past Medical History:  
Diagnosis Date  Anemia  Back pain, chronic  Hematuria, microscopic  UTI (urinary tract infection)   
 had one late 2013 ROS Patient states that she is feeling well. Current Outpatient Medications Medication Sig  
 naproxen (NAPROSYN) 500 mg tablet Take 1 Tab by mouth two (2) times daily (with meals).  loratadine-pseudoephedrine (CLARITIN-D 24 HOUR)  mg per tablet Take 1 Tab by mouth daily.  fluticasone (FLONASE) 50 mcg/actuation nasal spray 2 Sprays by Both Nostrils route daily. No current facility-administered medications for this visit. PE Visit Vitals /78 (BP 1 Location: Left arm, BP Patient Position: Sitting) Pulse 75 Temp 97 °F (36.1 °C) (Temporal) Resp 16 Ht 5' 3\" (1.6 m) Wt 167 lb (75.8 kg) LMP 02/05/2018 (Within Weeks) SpO2 97% BMI 29.58 kg/m²  
bilatteral breast exam without mass. No overlying skin or nipple changes. Speculum exam with normal appearing cervix and no discharge. Bimanual exam without mass or TTP Assessment and Plan: ICD-10-CM ICD-9-CM 1. Screening for breast cancer Z12.31 V76.10 ALINA MAMMO BI SCREENING INCL CAD 2. Screening for cervical cancer Z12.4 V76.2 PAP, LB, RFX HPV [id number]) Exam reassuring Pap ordered Mammogram ordered through [facility].   Pt will let us know if she does not receive call to schedule mammogram. 
 
 
Senait Quintana MD

## 2019-02-18 ENCOUNTER — HOSPITAL ENCOUNTER (OUTPATIENT)
Dept: MAMMOGRAPHY | Age: 54
Discharge: HOME | End: 2019-02-18
Attending: FAMILY MEDICINE

## 2019-02-18 DIAGNOSIS — Z12.39 SCREENING FOR BREAST CANCER: ICD-10-CM

## 2019-02-18 PROCEDURE — 77063 BREAST TOMOSYNTHESIS BI: CPT

## 2019-04-02 ENCOUNTER — OFFICE VISIT (OUTPATIENT)
Dept: FAMILY MEDICINE CLINIC | Age: 54
End: 2019-04-02

## 2019-04-02 ENCOUNTER — HOSPITAL ENCOUNTER (OUTPATIENT)
Dept: LAB | Age: 54
Discharge: HOME | End: 2019-04-02

## 2019-04-02 VITALS
HEIGHT: 63 IN | OXYGEN SATURATION: 97 % | TEMPERATURE: 97.8 F | WEIGHT: 169 LBS | RESPIRATION RATE: 12 BRPM | DIASTOLIC BLOOD PRESSURE: 78 MMHG | BODY MASS INDEX: 29.95 KG/M2 | SYSTOLIC BLOOD PRESSURE: 119 MMHG | HEART RATE: 99 BPM

## 2019-04-02 DIAGNOSIS — Z00.00 HEALTHCARE MAINTENANCE: ICD-10-CM

## 2019-04-02 DIAGNOSIS — Z00.00 HEALTHCARE MAINTENANCE: Primary | ICD-10-CM

## 2019-04-02 DIAGNOSIS — M54.42 ACUTE LEFT-SIDED LOW BACK PAIN WITH LEFT-SIDED SCIATICA: ICD-10-CM

## 2019-04-02 LAB
APPEARANCE UR: NORMAL
BILIRUB UR QL STRIP: NEGATIVE
BILIRUB UR QL: NEGATIVE
COLOR UR: YELLOW
GLUCOSE UR STRIP.AUTO-MCNC: NEGATIVE MG/DL
GLUCOSE UR-MCNC: NEGATIVE MG/DL
HGB UR QL STRIP: NEGATIVE
KETONES P FAST UR STRIP-MCNC: NEGATIVE MG/DL
KETONES UR QL STRIP.AUTO: NEGATIVE MG/DL
LEUKOCYTE ESTERASE UR QL STRIP.AUTO: NEGATIVE
NITRITE UR QL STRIP.AUTO: NEGATIVE
PH UR STRIP: 5.5 [PH] (ref 4.6–8)
PH UR STRIP: 5.5 [PH] (ref 5–8)
PROT UR QL STRIP: NORMAL
PROT UR STRIP-MCNC: NEGATIVE MG/DL
SP GR UR REFRACTOMETRY: 1.02 (ref 1–1.03)
SP GR UR STRIP: 1.03 (ref 1–1.03)
UA UROBILINOGEN AMB POC: NORMAL (ref 0.2–1)
URINALYSIS CLARITY POC: CLEAR
URINALYSIS COLOR POC: YELLOW
URINE BLOOD POC: NORMAL
URINE LEUKOCYTES POC: NEGATIVE
URINE NITRITES POC: NEGATIVE
UROBILINOGEN UR QL STRIP.AUTO: 0.2 EU/DL (ref 0.2–1)

## 2019-04-02 PROCEDURE — 81003 URINALYSIS AUTO W/O SCOPE: CPT

## 2019-04-02 PROCEDURE — 87086 URINE CULTURE/COLONY COUNT: CPT

## 2019-04-02 NOTE — PROGRESS NOTES
1. Have you been to the ER, urgent care clinic since your last visit? Hospitalized since your last visit? No 
 
2. Have you seen or consulted any other health care providers outside of the [address] [place] [address] since your last visit? Include any pap smears or colon screening.  No

## 2019-04-02 NOTE — PROGRESS NOTES
HPI 
Danni Cedeño is a 46 y.o. female being seen today for Chief Complaint Patient presents with  Back Pain \"pt stated that she been having back pain on the left side pt stated that this been going on for about month\"  
follow up for this pt with new problem of back pain. she states that for a month has left low back pain radiating down to foot. No numbness or tingling, no injury. No dysuria or fever or increased frequency Does have hx of kidney stone seen on CT 2014 and saw urology for awhile Past Medical History:  
Diagnosis Date  Anemia  Back pain, chronic  Hematuria, microscopic  UTI (urinary tract infection)   
 had one late 2013 ROS Patient states that she is feeling well. Denies complaints of chest pain, shortness of breath, swelling of legs, dizziness or weakness. she denies nausea, vomiting or diarrhea. Current Outpatient Medications Medication Sig  
 naproxen (NAPROSYN) 500 mg tablet Take 1 Tab by mouth two (2) times daily (with meals).  loratadine-pseudoephedrine (CLARITIN-D 24 HOUR)  mg per tablet Take 1 Tab by mouth daily.  fluticasone (FLONASE) 50 mcg/actuation nasal spray 2 Sprays by Both Nostrils route daily. No current facility-administered medications for this visit. PE Visit Vitals /78 (BP 1 Location: Right arm, BP Patient Position: Sitting) Pulse 99 Temp 97.8 °F (36.6 °C) (Temporal) Resp 12 Ht 5' 3\" (1.6 m) Wt 169 lb (76.7 kg) SpO2 97% BMI 29.94 kg/m² Alert and oriented with normal mood and affect. she is well developed and well nourished . Lungs are clear without wheezing. Heart rate is regular without murmurs or gallops. There is no lower extremity edema. TTP left lower back and some pain with stress to SI joint area Assessment and Plan: ICD-10-CM ICD-9-CM 1. Healthcare maintenance Z00.00 V70.0 AMB POC URINALYSIS DIP STICK AUTO W/ MICRO URINALYSIS W/ RFLX MICROSCOPIC  
   CULTURE, URINE 2. Acute left-sided low back pain with left-sided sciatica M54.42 724.2   
  724.3 Back pain most c/w musculoskeltal on exam although she does have trace blood in urine as well as hx of kidney stone Stretches for SI joint and sciatica 
otc aleve bid with food Urine for micro and culture Call or rtc if not improving in a few weeks Consider CT scan abdomen Michelle Martino MD

## 2019-04-02 NOTE — PATIENT INSTRUCTIONS
Sciatica: Exercises Your Care Instructions Here are some examples of typical rehabilitation exercises for your condition. Start each exercise slowly. Ease off the exercise if you start to have pain. Your doctor or physical therapist will tell you when you can start these exercises and which ones will work best for you. When you are not being active, find a comfortable position for rest. Some people are comfortable on the floor or a medium-firm bed with a small pillow under their head and another under their knees. Some people prefer to lie on their side with a pillow between their knees. Don't stay in one position for too long. Take short walks (10 to 20 minutes) every 2 to 3 hours. Avoid slopes, hills, and stairs until you feel better. Walk only distances you can manage without pain, especially leg pain. How to do the exercises Back stretches 1. Get down on your hands and knees on the floor. 2. Relax your head and allow it to droop. Round your back up toward the ceiling until you feel a nice stretch in your upper, middle, and lower back. Hold this stretch for as long as it feels comfortable, or about 15 to 30 seconds. 3. Return to the starting position with a flat back while you are on your hands and knees. 4. Let your back sway by pressing your stomach toward the floor. Lift your buttocks toward the ceiling. 5. Hold this position for 15 to 30 seconds. 6. Repeat 2 to 4 times. Follow-up care is a key part of your treatment and safety. Be sure to make and go to all appointments, and call your doctor if you are having problems. It's also a good idea to know your test results and keep a list of the medicines you take. Where can you learn more? Go to http://kuldeep-isaiah.info/. Enter S107 in the search box to learn more about \"Sciatica: Exercises. \" Current as of: September 20, 2018 Content Version: 11.9 © 7126-1932 [employer].  Care instructions adapted under license by [address] (which disclaims liability or warranty for this information). If you have questions about a medical condition or this instruction, always ask your healthcare professional. [address] any warranty or liability for your use of this information. Sacroiliac Pain: Exercises Your Care Instructions Here are some examples of typical rehabilitation exercises for your condition. Start each exercise slowly. Ease off the exercise if you start to have pain. Your doctor or physical therapist will tell you when you can start these exercises and which ones will work best for you. How to do the exercises Knee-to-chest stretch 7. Do not do the knee-to-chest exercise if it causes or increases back or leg pain. 8. Lie on your back with your knees bent and your feet flat on the floor. You can put a small pillow under your head and neck if it is more comfortable. 9. Grasp your hands under one knee and bring the knee to your chest, keeping the other foot flat on the floor. 10. Keep your lower back pressed to the floor. Hold for at least 15 to 30 seconds. 11. Relax and lower the knee to the starting position. Repeat with the other leg. 12. Repeat 2 to 4 times with each leg. 13. To get more stretch, keep your other leg flat on the floor while pulling your knee to your chest. 
 
Bridging 1. Lie on your back with both knees bent. Your knees should be bent about 90 degrees. 2. Tighten your belly muscles by pulling in your belly button toward your spine. Then push your feet into the floor, squeeze your buttocks, and lift your hips off the floor until your shoulders, hips, and knees are all in a straight line. 3. Hold for about 6 seconds as you continue to breathe normally, and then slowly lower your hips back down to the floor and rest for up to 10 seconds. 4. Repeat 8 to 12 times. Hip extension 1. Get down on your hands and knees on the floor. 2. Keeping your back and neck straight, lift one leg straight out behind you. When you lift your leg, keep your hips level. Don't let your back twist, and don't let your hip drop toward the floor. 3. Hold for 6 seconds. Repeat 8 to 12 times with each leg. 4. If you feel steady and strong when you do this exercise, you can make it more difficult. To do this, when you lift your leg, also lift the opposite arm straight out in front of you. For example, lift the left leg and the right arm at the same time. (This is sometimes called the \"bird dog exercise. \") Hold for 6 seconds, and repeat 8 to 12 times on each side. Clamshell 1. Lie on your side with a pillow under your head. Keep your feet and knees together and your knees bent. 2. Raise your top knee, but keep your feet together. Do not let your hips roll back. Your legs should open up like a clamshell. 3. Hold for 6 seconds. 4. Slowly lower your knee back down. Rest for 10 seconds. 5. Repeat 8 to 12 times. 6. Switch to your other side and repeat steps 1 through 5. Hamstring wall stretch 1. Lie on your back in a doorway, with one leg through the open door. 2. Slide your affected leg up the wall to straighten your knee. You should feel a gentle stretch down the back of your leg. 1. Do not arch your back. 2. Do not bend either knee. 3. Keep one heel touching the floor and the other heel touching the wall. Do not point your toes. 3. Hold the stretch for at least 1 minute to begin. Then try to lengthen the time you hold the stretch to as long as 6 minutes. 4. Switch legs, and repeat steps 1 through 3. 
5. Repeat 2 to 4 times. 6. If you do not have a place to do this exercise in a doorway, there is another way to do it: 
7. Lie on your back, and bend one knee. 8. Loop a towel under the ball and toes of that foot, and hold the ends of the towel in your hands. 9. Straighten your knee, and slowly pull back on the towel.  You should feel a gentle stretch down the back of your leg. 10. Switch legs, and repeat steps 1 through 3. 
11. Repeat 2 to 4 times. Lower abdominal strengthening 1. Lie on your back with your knees bent and your feet flat on the floor. 2. Tighten your belly muscles by pulling your belly button in toward your spine. 3. Lift one foot off the floor and bring your knee toward your chest, so that your knee is straight above your hip and your leg is bent like the letter \"L. \" 
4. Lift the other knee up to the same position. 5. Lower one leg at a time to the starting position. 6. Keep alternating legs until you have lifted each leg 8 to 12 times. 7. Be sure to keep your belly muscles tight and your back still as you are moving your legs. Be sure to breathe normally. Piriformis stretch 1. Lie on your back with your legs straight. 2. Lift your affected leg, and bend your knee. With your opposite hand, reach across your body, and then gently pull your knee toward your opposite shoulder. 3. Hold the stretch for 15 to 30 seconds. 4. Switch legs and repeat steps 1 through 3. 
5. Repeat 2 to 4 times. Follow-up care is a key part of your treatment and safety. Be sure to make and go to all appointments, and call your doctor if you are having problems. It's also a good idea to know your test results and keep a list of the medicines you take. Where can you learn more? Go to http://kuldeep-isaiah.info/. Enter N366 in the search box to learn more about \"Sacroiliac Pain: Exercises. \" Current as of: September 20, 2018 Content Version: 11.9 © 5172-2186 [employer]. Care instructions adapted under license by [employer] (which disclaims liability or warranty for this information).  If you have questions about a medical condition or this instruction, always ask your healthcare professional. [address]  any warranty or liability for your use of this information.

## 2019-04-04 LAB
BACTERIA SPEC CULT: NORMAL
SERVICE CMNT-IMP: NORMAL

## 2021-05-04 ENCOUNTER — OFFICE VISIT (OUTPATIENT)
Dept: FAMILY MEDICINE CLINIC | Facility: CLINIC | Age: 56
End: 2021-05-04

## 2021-05-04 VITALS
RESPIRATION RATE: 16 BRPM | TEMPERATURE: 97.7 F | SYSTOLIC BLOOD PRESSURE: 134 MMHG | HEIGHT: 63 IN | OXYGEN SATURATION: 97 % | WEIGHT: 176.2 LBS | BODY MASS INDEX: 31.22 KG/M2 | HEART RATE: 74 BPM | DIASTOLIC BLOOD PRESSURE: 91 MMHG

## 2021-05-04 DIAGNOSIS — G89.29 CHRONIC BILATERAL LOW BACK PAIN WITHOUT SCIATICA: Primary | ICD-10-CM

## 2021-05-04 DIAGNOSIS — M54.50 CHRONIC BILATERAL LOW BACK PAIN WITHOUT SCIATICA: Primary | ICD-10-CM

## 2021-05-04 DIAGNOSIS — R07.9 CHEST PAIN, UNSPECIFIED TYPE: ICD-10-CM

## 2021-05-04 PROCEDURE — 99213 OFFICE O/P EST LOW 20 MIN: CPT | Performed by: FAMILY MEDICINE

## 2021-05-04 RX ORDER — LANSOPRAZOLE 30 MG/1
30 CAPSULE, DELAYED RELEASE ORAL
Qty: 30 CAP | Refills: 5 | Status: SHIPPED | OUTPATIENT
Start: 2021-05-04

## 2021-05-04 NOTE — PROGRESS NOTES
Discharge instructions reviewed with patient    Medication list and understanding of medications reviewed with patient. OTC and herbal medications reviewed and added to med list if applicable  Barriers to adherence assessed. Guidance given regarding new medications this visit, including reason for taking this medicine, and common side effects. AVS given to patient. Explained to patient. Patient expressed understanding. Time, date and location of Physical Therapy appointment given to patient. Informed physical therapy about patient applying for [facility] financial assistance and only having a $10 copayment. Coupon for lansoprazole given to patient along with [employer] financial assistance application. Informed patient to mail financial assistance application off to address highlighted along with 3 pay stubs. Patient expressed understanding.

## 2021-05-04 NOTE — PATIENT INSTRUCTIONS
Gastroesophageal Reflux Disease (GERD): Care Instructions Overview Gastroesophageal reflux disease (GERD) is the backward flow of stomach acid into the esophagus. The esophagus is the tube that leads from your throat to your stomach. A one-way valve prevents the stomach acid from backing up into this tube. But when you have GERD, this valve does not close tightly enough. This can also cause pain and swelling in your esophagus. (This is called esophagitis.) If you have mild GERD symptoms including heartburn, you may be able to control the problem with antacids or over-the-counter medicine. You can also make lifestyle changes to help reduce your symptoms. These include changing your diet and eating habits, such as not eating late at night and losing weight. Follow-up care is a key part of your treatment and safety. Be sure to make and go to all appointments, and call your doctor if you are having problems. It's also a good idea to know your test results and keep a list of the medicines you take. How can you care for yourself at home? · Take your medicines exactly as prescribed. Call your doctor if you think you are having a problem with your medicine. · Your doctor may recommend over-the-counter medicine. For mild or occasional indigestion, antacids, such as Tums, Gaviscon, Mylanta, or Maalox, may help. Your doctor also may recommend over-the-counter acid reducers, such as famotidine (Pepcid AC), cimetidine (Tagamet HB), or omeprazole (Prilosec). Read and follow all instructions on the label. If you use these medicines often, talk with your doctor. · Change your eating habits. ? It's best to eat several small meals instead of two or three large meals. ? After you eat, wait 2 to 3 hours before you lie down. ? Chocolate, mint, and alcohol can make GERD worse. ? Spicy foods, foods that have a lot of acid (like tomatoes and oranges), and coffee can make GERD symptoms worse in some people.  If your symptoms are worse after you eat a certain food, you may want to stop eating that food to see if your symptoms get better. · Do not smoke or chew tobacco. Smoking can make GERD worse. If you need help quitting, talk to your doctor about stop-smoking programs and medicines. These can increase your chances of quitting for good. · If you have GERD symptoms at night, raise the head of your bed 6 to 8 inches by putting the frame on blocks or placing a foam wedge under the head of your mattress. (Adding extra pillows does not work.) · Do not wear tight clothing around your middle. · Lose weight if you need to. Losing just 5 to 10 pounds can help. When should you call for help? Call your doctor now or seek immediate medical care if: 
  · You have new or different belly pain.  
  · Your stools are black and tarlike or have streaks of blood. Watch closely for changes in your health, and be sure to contact your doctor if: 
  · Your symptoms have not improved after 2 days.  
  · Food seems to catch in your throat or chest.  
Where can you learn more? Go to http://www.gray.com/ Enter M849 in the search box to learn more about \"Gastroesophageal Reflux Disease (GERD): Care Instructions. \" Current as of: April 15, 2020               Content Version: 12.8 © 2006-2021 [employer]. Care instructions adapted under license by [employer] (which disclaims liability or warranty for this information). If you have questions about a medical condition or this instruction, always ask your healthcare professional. [place] [address] any warranty or liability for your use of this information. Back Pain: Care Instructions Your Care Instructions Back pain has many possible causes. It is often related to problems with muscles and ligaments of the back. It may also be related to problems with the nerves, discs, or bones of the back.  Moving, lifting, standing, sitting, or sleeping in an awkward way can strain the back. Sometimes you don't notice the injury until later. Arthritis is another common cause of back pain. Although it may hurt a lot, back pain usually improves on its own within several weeks. Most people recover in 12 weeks or less. Using good home treatment and being careful not to stress your back can help you feel better sooner. Follow-up care is a key part of your treatment and safety. Be sure to make and go to all appointments, and call your doctor if you are having problems. It's also a good idea to know your test results and keep a list of the medicines you take. How can you care for yourself at home? · Sit or lie in positions that are most comfortable and reduce your pain. Try one of these positions when you lie down: ? Lie on your back with your knees bent and supported by large pillows. ? Lie on the floor with your legs on the seat of a sofa or chair. ? Lie on your side with your knees and hips bent and a pillow between your legs. ? Lie on your stomach if it does not make pain worse. · Do not sit up in bed, and avoid soft couches and twisted positions. Bed rest can help relieve pain at first, but it delays healing. Avoid bed rest after the first day of back pain. · Change positions every 30 minutes. If you must sit for long periods of time, take breaks from sitting. Get up and walk around, or lie in a comfortable position. · Try using a heating pad on a low or medium setting for 15 to 20 minutes every 2 or 3 hours. Try a warm shower in place of one session with the heating pad. · You can also try an ice pack for 10 to 15 minutes every 2 to 3 hours. Put a thin cloth between the ice pack and your skin. · Take pain medicines exactly as directed. ? If the doctor gave you a prescription medicine for pain, take it as prescribed.  
? If you are not taking a prescription pain medicine, ask your doctor if you can take an over-the-counter medicine. · Take short walks several times a day. You can start with 5 to 10 minutes, 3 or 4 times a day, and work up to longer walks. Walk on level surfaces and avoid hills and stairs until your back is better. · Return to work and other activities as soon as you can. Continued rest without activity is usually not good for your back. · To prevent future back pain, do exercises to stretch and strengthen your back and stomach. Learn how to use good posture, safe lifting techniques, and proper body mechanics. When should you call for help? Call your doctor now or seek immediate medical care if: 
  · You have new or worsening numbness in your legs.  
  · You have new or worsening weakness in your legs. (This could make it hard to stand up.)  
  · You lose control of your bladder or bowels. Watch closely for changes in your health, and be sure to contact your doctor if: 
  · You have a fever, lose weight, or don't feel well.  
  · You do not get better as expected. Where can you learn more? Go to http://www.gray.com/ Enter Q195 in the search box to learn more about \"Back Pain: Care Instructions. \" Current as of: November 16, 2020               Content Version: 12.8 © 2006-2021 [employer]. Care instructions adapted under license by [employer] (which disclaims liability or warranty for this information). If you have questions about a medical condition or this instruction, always ask your healthcare professional. [place] [address] any warranty or liability for your use of this information.

## 2021-05-04 NOTE — PROGRESS NOTES
HPI  Keila Lu is a 63 y.o. female being seen today for   Chief Complaint   Patient presents with    Chest Pain     \"pt stated that she been having chest pains for about 2 months pt stated that the pain have gotten worse\"   follow up for this pt with some new complaints. she states that for a few months she has chest pain. It does not seem to have a pattern. No relation to eating. It feels like gas or pressure. She used beano and not sure it helped. Tried baking soda and that did resolve her sx completely but they came back eventually. Hassie Rushing will releive it but she will still feel bloated. Gas x does not seem to help. Also her back is still hurting. She has done PT in the past years ago and not sure it really helped. Her pain is worse since then. Does not radiate. History of kidney stone years ago per chart. Past Medical History:   Diagnosis Date    Anemia     Back pain, chronic     Hematuria, microscopic     UTI (urinary tract infection)     had one late 2013         ROS  Patient states that she is feeling well. Denies complaints of chest pain, shortness of breath, swelling of legs, dizziness or weakness. she denies nausea, vomiting or diarrhea. Current Outpatient Medications   Medication Sig    lansoprazole (Prevacid) 30 mg capsule Take 1 Cap by mouth Daily (before breakfast).  naproxen (NAPROSYN) 500 mg tablet Take 1 Tab by mouth two (2) times daily (with meals).  loratadine-pseudoephedrine (CLARITIN-D 24 HOUR)  mg per tablet Take 1 Tab by mouth daily.  fluticasone (FLONASE) 50 mcg/actuation nasal spray 2 Sprays by Both Nostrils route daily. No current facility-administered medications for this visit.         PE  Visit Vitals  BP (!) 134/91 (BP 1 Location: Left arm, BP Patient Position: Sitting, BP Cuff Size: Adult)   Pulse 74   Temp 97.7 °F (36.5 °C)   Resp 16   Ht 5' 3\" (1.6 m)   Wt 176 lb 3.2 oz (79.9 kg)   SpO2 97%   BMI 31.21 kg/m²        Alert and oriented with normal mood and affect. she is well developed and well nourished . Lungs are clear without wheezing. Heart rate is regular without murmurs or gallops. There is no lower extremity edema. Assessment and Plan:        ICD-10-CM ICD-9-CM    1. Chronic bilateral low back pain without sciatica   Refer back to PT M54.5 724.2 REFERRAL TO PHYSICAL THERAPY    G89.29 338.29    2. Chest pain, unspecified type   Sounds gastrointestinal.  Will do trial of protonix. She will give a call if it does not help her sx in a few days. Reviewed s/sx of ACS and when to seek emergency help.   R07.9 786.50            Riley Umana MD

## 2021-05-05 ENCOUNTER — TELEPHONE (OUTPATIENT)
Dept: FAMILY MEDICINE CLINIC | Facility: CLINIC | Age: 56
End: 2021-05-05

## 2021-05-05 NOTE — TELEPHONE ENCOUNTER
Patient advised of appointment for physical therapy evaluation  5/19/21  Arrive 2:15pm [facility] [place] [id number]

## 2021-05-13 ENCOUNTER — APPOINTMENT (OUTPATIENT)
Dept: PHYSICAL THERAPY | Age: 56
End: 2021-05-13
Attending: FAMILY MEDICINE

## 2021-05-14 ENCOUNTER — APPOINTMENT (OUTPATIENT)
Dept: PHYSICAL THERAPY | Age: 56
End: 2021-05-14
Attending: FAMILY MEDICINE

## 2021-05-28 ENCOUNTER — TELEPHONE (OUTPATIENT)
Dept: FAMILY MEDICINE CLINIC | Facility: CLINIC | Age: 56
End: 2021-05-28

## 2021-05-28 NOTE — TELEPHONE ENCOUNTER
Patient states couldn't make physical therapy appointment on 5/13/21  Planning to call physical therapy back to reschedule appointment

## 2024-07-31 PROCEDURE — 87624 HPV HI-RISK TYP POOLED RSLT: CPT | Performed by: OBSTETRICS & GYNECOLOGY

## 2024-08-08 ENCOUNTER — HOSPITAL ENCOUNTER (OUTPATIENT)
Dept: RADIOLOGY | Facility: HOSPITAL | Age: 59
Discharge: HOME | End: 2024-08-08
Attending: OBSTETRICS & GYNECOLOGY
Payer: COMMERCIAL

## 2024-08-08 DIAGNOSIS — Z12.31 ENCOUNTER FOR SCREENING MAMMOGRAM FOR MALIGNANT NEOPLASM OF BREAST: ICD-10-CM

## 2024-08-29 ENCOUNTER — HOSPITAL ENCOUNTER (OUTPATIENT)
Dept: RADIOLOGY | Facility: HOSPITAL | Age: 59
Discharge: HOME | End: 2024-08-29
Attending: OBSTETRICS & GYNECOLOGY
Payer: COMMERCIAL

## 2024-08-29 DIAGNOSIS — N64.59 INVERSION OF RIGHT NIPPLE: ICD-10-CM

## 2024-08-29 PROCEDURE — 77062 BREAST TOMOSYNTHESIS BI: CPT | Mod: TC

## 2024-08-29 PROCEDURE — 77066 DX MAMMO INCL CAD BI: CPT | Mod: TC

## 2024-08-29 PROCEDURE — 76642 ULTRASOUND BREAST LIMITED: CPT | Mod: TC,LT

## 2024-08-29 PROCEDURE — 76641 ULTRASOUND BREAST COMPLETE: CPT | Mod: TC,RT

## 2024-08-30 NOTE — PROGRESS NOTES
[facility] - Breast [place] [facility]  Breast Surgical Oncology  New Patient Office Visit - H&P      Referring Provider: No ref. provider found  PCP: No, Primary Doctor   Care Team:  OBGYN: No data on file.    Chief Complaint:   Chief Complaint   Patient presents with    Biopsy     Patient c/o right nipple skin discoloration, mild redness, no pain, no nipple discharge        Subjective:     HPI:  Kaelyn Espinoza is a 58 y.o. female who presents on 9/3/2024 for evaluation of right nipple inversion and erythema around nipple areola complex that started about 1 year ago. Until last week, patient had not undergone mammogram in many years.  Patient underwent bilateral diagnostic mammogram and ultrasound last week which is still pending official read.  She currently denies any other breast issues including pain, swelling, nipple discharge, or new lumps/masses.  Patient states she has never previously undergone any breast biopsies or breast surgeries.  She has never undergone genetic testing.  She has no family history of breast cancer.  She is currently postmenopausal.  She is not on any hormone replacement therapy.  Patient states she lives a relatively healthy lifestyle.  She walks every day for exercise.  Patient does not smoke or drink alcohol.  She has no significant past medical history. She currently works as a .    Imagin2024 BL DG MG and US - pending official read.    Pathology:   None     OB/GYN History:  Age at Menarche Onset: 12  Menopausal Status: postmenopausal, LMP: No LMP recorded (lmp unknown). Patient is postmenopausal.  Hysterectomy/Oophorectomy: menopause, at age 39  Hormonal birth control (duration): NA  Pregnancy History:   Age at first live birth: 17  Hormone Replacement Therapy: No, none    Other:  MG breast density: No breast composition recorded.   Prior thoracic RT: none  Genetic testing:  None  Ashkenazi Episcopal descent: No    Family History:  Family  "History   Problem Relation Name Age of Onset    Heart disease Father      Hypertension Mother          Patient History:  History reviewed. No pertinent past medical history.    Past Surgical History:   Procedure Laterality Date    BUNIONECTOMY      HERNIA REPAIR      KNEE SURGERY      TONSILLECTOMY         Social History     Socioeconomic History    Marital status: Single   Tobacco Use    Smoking status: Never    Smokeless tobacco: Never         There is no immunization history on file for this patient.    Medications/Allergies:  No current outpatient medications on file.     Review of patient's allergies indicates:  No Known Allergies    Review of Systems:  All pertinent history mentioned in HPI.     Objective:     Vitals:  Vitals:    09/03/24 1111   BP: 107/65   BP Location: Left arm   Patient Position: Sitting   BP Method: Small (Automatic)   Pulse: 66   Resp: 20   Temp: 98.5 °F (36.9 °C)   SpO2: 98%   Weight: 56.1 kg (123 lb 9.6 oz)   Height: 5' 3" (1.6 m)       Body mass index is 21.89 kg/m².     Physical Exam:  General: The patient is awake, alert and oriented times three. The patient is well nourished and in no acute distress.  Neck: There is no evidence of palpable cervical, supraclavicular or axillary adenopathy. The neck is supple. The thyroid is not enlarged.  Musculoskeletal: The patient has a normal range of motion of her bilateral upper extremities.  Chest: Examination of the chest wall fails to reveal any obvious abnormalities.  The lungs are clear to auscultation bilaterally without rales, rhonchi, or wheezing.  Cardiovascular: The heart has a regular rate and rhythm without murmurs, gallops or rubs.  Breast:   Right:  Examination of right breast fails to reveal any dominant masses. Dense breast tissue palpated in the upper outer quadrant.  The nipple is inverted without evidence of discharge. There is no skin dimpling with movement of the pectoralis.  There is about an erythematous plaque noted over " NAC which spans about 3 cm in length. (Pictured below)   Left:  Examination of the left breast fails to reveal any dominant masses. Dense breast tissue palpated in upper outer quadrant. The nipple is everted without evidence of discharge. There is no skin dimpling with movement of the pectoralis. There are no significant skin changes overlying the breast.  Abdomen: The abdomen is soft, flat, nontender and nondistended with no palpable masses or organomegaly.  Integumentary: no rashes or skin lesions present  Neurologic: cranial nerves intact, no signs of peripheral neurological deficit, motor/sensory function intact    Assessment:     There is no problem list on file for this patient.       Kaelyn was seen today for biopsy.    Diagnoses and all orders for this visit:    Retraction of right nipple  -     Specimen to Pathology Breast    Lesion of right nipple  -     Specimen to Pathology Breast          Plan:       Kaelyn Espinoza is a 58 y.o. female who presents on 9/3/2024 for evaluation of right nipple inversion and erythema around nipple areolar complex that started about 1 year ago. Until last week, patient had not undergone mammogram in many years.  Patient underwent bilateral diagnostic mammogram and ultrasound last week which is still pending official read.  She currently denies any other breast issues including pain, swelling, nipple discharge, or new lumps/masses. On physical exam today, examination of right breast fails to reveal any dominant masses. Dense breast tissue palpated in the upper outer quadrant. The nipple is inverted without evidence of discharge. There is no skin dimpling with movement of the pectoralis. There is about an erythematous plaque noted over NAC which spans about 3 cm in length.  Discussed possible causes of skin changes/nipple inversion around breast.  Discussed with her that I recommend punch biopsy to rule out Paget's disease. Patient in agreement with this plan. Consents reviewed  and signed with patient. Punch biopsy performed today without complication and tissue sent off to pathology.  We will follow up with patient in about a week.  All questions answered.      All of her questions were answered. She was advised to call if she develops any questions or concerns.    Lore Ortega PA-C     --------------------------------------------------------------------------------------------------------------  Total time on the date of the visit ranged from 60-74 mins (68519). Total time includes both face-to-face and non-face-to-face time personally spent by myself on the day of the visit.    Non-face-to-face time included:  _X_ preparing to see the patient such as reviewing the patient record  _X_ obtaining and reviewing separately obtained history  _X_ independently interpreting results  _X_ documenting clinical information in electronic health record.    Face-to-face time included:  _X_ performing an appropriate history and examination  _X_ communicating results to the patient  _X_ counseling and educating the patient  __ ordering appropriate medications  _x_ ordering appropriate tests  _X_ ordering appropriate procedures (including follow-up)  _X_ answering any questions the patient had    Total Time spent on date of visit: 60 minutes

## 2024-09-03 ENCOUNTER — OFFICE VISIT (OUTPATIENT)
Dept: SURGERY | Facility: CLINIC | Age: 59
End: 2024-09-03
Payer: COMMERCIAL

## 2024-09-03 VITALS
DIASTOLIC BLOOD PRESSURE: 65 MMHG | WEIGHT: 123.63 LBS | HEART RATE: 66 BPM | BODY MASS INDEX: 21.91 KG/M2 | TEMPERATURE: 99 F | OXYGEN SATURATION: 98 % | HEIGHT: 63 IN | SYSTOLIC BLOOD PRESSURE: 107 MMHG | RESPIRATION RATE: 20 BRPM

## 2024-09-03 DIAGNOSIS — N64.9 LESION OF RIGHT NIPPLE: ICD-10-CM

## 2024-09-03 DIAGNOSIS — N64.53 RETRACTION OF RIGHT NIPPLE: Primary | ICD-10-CM

## 2024-09-03 PROCEDURE — 99999 PR PBB SHADOW E&M-EST. PATIENT-LVL III: CPT | Mod: PBBFAC,,,

## 2024-09-03 NOTE — PROCEDURES
Punch Biopsy Procedure Note    Pre-operative Diagnosis: right breast skin thickening/erythema     Post-operative Diagnosis: same    Location: right breast    Anesthesia: 1% plain lidocaine    Procedure Details   The Procedure, risks and complications have been discussed in detail (including, but not limited to pain, infection, bleeding) with the patient, and the patient has signed consent to have the surgery completed.    The skin was sterilely prepped and draped over the affected area in the usual fashion.  Local anesthetic was injected in the affected area.  Next, using a 4 mm punch, a small skin sample was obtained and sent to pathology for permanent sectioning.  A 4-0 chromic was used to approximate the skin edges.  Dressing was applied.  Patient tolerated well.     EBL: minimal    Condition:  Stable    Complications:  none.     Lore Ortega PA-C

## 2024-09-05 ENCOUNTER — TELEPHONE (OUTPATIENT)
Dept: SURGERY | Facility: CLINIC | Age: 59
End: 2024-09-05
Payer: COMMERCIAL

## 2024-09-05 DIAGNOSIS — C50.011: Primary | ICD-10-CM

## 2024-09-05 LAB — PSYCHE PATHOLOGY RESULT: NORMAL

## 2024-09-05 NOTE — TELEPHONE ENCOUNTER
Discussed right breast biopsy pathology results with patient. All questions answered. Will schedule her for follow up appointment with Dr. Ya for surgical discussion.     Lore Ortega PA-C

## 2024-09-23 NOTE — H&P (VIEW-ONLY)
[facility] - Breast [place] [facility]  Breast Surgical Oncology  New Patient Office Visit - H&P      Referring Provider: No ref. provider found   PCP: No, Primary Doctor     Patient Care Team:  No, Primary Doctor as PCP - Bekah Higgins, RN as Registered Nurse      Chief Complaint:   Chief Complaint   Patient presents with    Breast Cancer     Patient reports no breast related concerns         Subjective:       HPI:  Kaelyn Espinoza is a 59 y.o. female who presents on 9/26/2024 for evaluation of newly diagnosed right  breast cancer.    She presented on 9/3/2024 for evaluation of right nipple inversion and erythema around nipple areola complex that started about 1 year ago. Patient had not undergone mammogram in many years. A punch biopsy was done at that time on the suspicious site which was found to be Paget's Disease.    A detailed patient history was obtained and reviewed. She currently denies any breast issues including rashes, redness, pain, swelling, nipple discharge, or new lumps/masses.    MG breast density:     Imaging:    MRI Breast w/wo Contrast, w/CAD, Bilateral  9/18/2024  FINDINGS: There is mild background parenchymal enhancement. The breasts have heterogeneous fibroglandular tissue.   1) RIGHT BREAST:  The right nipple-areolar complex is thickened and demonstrates diffuse enhancement with flattening of the right nipple. In the upper-outer quadrant of the right breast, middle to posterior depths, there is heterogeneous, regional non mass enhancement which spans 45 mm AP x 20 mm transverse x 46 mm craniocaudal.  In the 11:00 subareolar right breast, anterior depth, there is a 10 x 7 x 8 mm oval, nonenhancing mass with circumscribed margins which corresponds to the sonographic finding in this region.  This mass is T1 bright and likely reflects blood products or proteinaceous fluid. No axillary or internal mammary lymphadenopathy is identified.    2) LEFT BREAST:  No suspicious areas  of enhancement or areas of architectural distortion are seen.  There is no skin thickening or nipple retraction.  No axillary or internal mammary lymphadenopathy is identified.   IMPRESSION: SUSPICIOUS OF MALIGNANCY 1. Thickening and diffuse enhancement of the right nipple-areolar complex with flattening of the right nipple corresponds to the biopsy-proven Paget's disease of the nipple.  2. Heterogeneous, regional non mass enhancement in the upper-outer quadrant of the right breast, middle to posterior depths, spanning up to 46 mm is suspicious.  3. Oval, nonenhancing T1 bright mass with circumscribed margins in the 11:00 subareolar right breast is suspicious.   RECOMMENDATIONS:   If breast conservation is being considered, recommend a right breast tomosynthesis/stereotactic guided core needle biopsy of the focal asymmetry in the 10:00 right breast, middle depth, which partially correlates with the non mass enhancement seen in this region as well as a right breast ultrasound-guided core needle biopsy of the mass in the 11:00 subareolar right breast. MRI BI-RADS: 4 Suspicious    BL DG MG w/ FRANCO US Breast Left Limited, US Breast Right Complete  8/29/2024  1) BREAST COMPOSITION: The breasts are heterogeneously dense, which may obscure small masses.  No suspicious masses, calcifications, or other signs of malignancy are identified.  The suspected asymmetries in the superior breasts bilaterally, middle depths, efface into normal fibroglandular tissue on the spot compression tomosynthesis images and are thus considered benign.    2) ULTRASOUND FINDINGS: High frequency real-time ultrasound evaluation was performed for further evaluation. Complete ultrasound evaluation of the right breast, including all 4 quadrants, subareolar regions, and axilla and targeted ultrasound evaluation of the superior and subareolar left breast and left axilla was performed.  In the 6:00 periareolar right breast there is an 8 x 2 x 3 mm oval,  hypoechoic intraductal mass versus debris. In the 11:00 subareolar right breast there is an 11 x 3 x 8 mm oval, hypoechoic intraductal mass versus debris.  In the 6:00 subareolar left breast there is a 10 x 5 x 5 mm oval, hypoechoic intraductal mass/debris.  Focal shadowing is noted in the 8:00 right breast, 4 cm from the nipple, 9:00 right breast, 6 cm from the nipple, and 12:00 right breast, 2 cm from the nipple, though no discrete mass is seen throughout the right breast except for the aforementioned masses.  Benign lymph nodes are noted in the bilateral axillae.  3) IMPRESSION: INCOMPLETE: NEEDS ADDITIONAL IMAGING EVALUATION         Pathology:  Right [place]ip[place] Punch Bippsy  2024  PAGET'S DISEASE OF THE NIPPLE.   Receptor status pending  ER   PA  HER2  Ki-67      OB/GYN History:  Age at Menarche Onset: 12  Menopausal Status: postmenopausal, LMP: No LMP recorded (lmp unknown). Patient is postmenopausal.  Hysterectomy/Oophorectomy: menopause, at age 39  Hormonal birth control (duration): No none.   Pregnancy History:   Age at first live birth: 17  Hormone Replacement Therapy: No, none  Patient denies nipple discharge. Reports nipple inversion and skin changes.   Patient denies to previous breast biopsy.   Patient denies to a personal history of breast cancer.          Other Relevant History:  Prior thoracic RT: none  Genetic testing: none  Ashkenazi Episcopalian descent: No    Family History:  Family History   Problem Relation Name Age of Onset    Hypertension Mother      Heart disease Father      Breast cancer Niece  30 - 40        Past History:  History reviewed. No pertinent past medical history.     Past Surgical History:   Procedure Laterality Date    BREAST BIOPSY      BUNIONECTOMY      HERNIA REPAIR      KNEE SURGERY      TONSILLECTOMY          Social History     Socioeconomic History    Marital status: Single   Tobacco Use    Smoking status: Never    Smokeless tobacco: Never   Substance and Sexual Activity  "   Alcohol use: Yes     Comment: socially    Drug use: Never        Body mass index is 21.97 kg/m².     Allergy/Medications:   Review of patient's allergies indicates:  No Known Allergies     No current outpatient medications on file.         Review of Systems:  Review of Systems   Constitutional:  Negative for chills, fever and weight loss.   Eyes:  Negative for blurred vision.   Respiratory:  Negative for shortness of breath.    Cardiovascular:  Negative for chest pain and leg swelling.   Gastrointestinal:  Negative for abdominal pain, heartburn, nausea and vomiting.   Genitourinary:  Negative for dysuria and frequency.   Musculoskeletal:  Negative for joint pain.   Neurological:  Positive for tingling. Negative for dizziness and headaches.         Tingling in left arm, wakes her up in the middle of night, improves with baby aspirin   Endo/Heme/Allergies:  Does not bruise/bleed easily.          Objective:     Vitals:  Blood pressure 102/64, pulse 70, temperature 97.9 °F (36.6 °C), temperature source Oral, resp. rate 18, height 5' 3" (1.6 m), weight 56.2 kg (124 lb), SpO2 99%.      Physical Exam:  General: The patient is awake, alert and oriented times three. The patient is well nourished and in no acute distress.   Neck: There is no evidence of palpable cervical, supraclavicular or axillary adenopathy. The neck is supple. The thyroid is not enlarged.   Musculoskeletal: The patient has a normal range of motion of her bilateral upper extremities.   Chest: Examination of the chest wall fails to reveal any obvious abnormalities. The lungs are clear to auscultation bilaterally without rales, rhonchi, or wheezing.   Cardiovascular: The heart has a regular rate and rhythm without murmurs, gallops or rubs.  Breast:  Physical Exam  Chest:   Breasts:     Right: Inverted nipple, mass and skin change present. No swelling, bleeding, nipple discharge or tenderness.      Left: Mass present. No swelling, bleeding, inverted " nipple, nipple discharge, skin change or tenderness.          Comments: No skin dimpling bilaterally      Abdomen: The abdomen is soft, flat, nontender and nondistended with no palpable masses or organomegaly.  Integumentary: no rashes or skin lesions present  Neurologic: cranial nerves intact, no signs of peripheral neurological deficit, motor/sensory function intact        Assessment and Discussion:      Cancer Staging   Paget's carcinoma of the nipple, right  Staging form: Breast, AJCC 8th Edition  - Clinical stage from 9/26/2024: Stage 0 (cTis (Paget), cN0, cM0, ER: Unknown, TX: Unknown, HER2: Unknown) - Unsigned        Encounter Diagnoses   Name Primary?    Paget's carcinoma of the nipple, right Yes      We discussed her imaging and pathology results in detail. Given the pathology revealing Paget's and the suspicion for additional malignant findings subareolar on MRI. We had a detailed discussion regarding her treatment options.     We discussed the need to proceed with local and systemic treatment.      Local Surgical Treatment options:     Breast Surgical Procedures - This would require additional biopsies of both the right and left breast  Breast Conservation Surgery (Partial Mastectomy or Lumpectomy)  Palpation-Guided - removal of breast cancer which is felt on clinical exam  Localization-Guided - required placement of a MagSeed and/or wire (if needed) in the area of concern to allow for identification during surgery. This will take place prior to surgery (usually a few days before). During surgery the MagSeed is detected with a probe and the cancer area is removed along with the MagSeed and/or previously placed clip.   Incisions are usually closed with dissolving stitches, followed by glue and Dermabond.   Mastectomy - Could be completed for both breasts  Simple - includes removal of breast skin, subcutaneous (fat) tissue, and nipple areolar complex.   Skin-Sparing - includes removal of the breast skin  "while sparing enough for reconstruction. It also is involves removal of the subcutaneous (fat) tissue and nipple areolar complex.  Nipple Sparing - includes removal of all the breast tissue underneath the nipple, areola, and breast skin is removed. The tissue beneath the nipple and areola are checked for cancer. If cancer is detected, the nipple and areola are then removed.   Surgical Risk include surgical site infections, hematoma or seroma requiring operation, necrosis of nipple-areola and/or mastectomy flaps requiring debridement or hyperbaric therapy, unplanned re-operations, and delay in adjuvant treatments.    Drain placement may be necessary after mastectomy and can remain in place for greater than 10-14 day, occasionally longer.     Axillary Surgical Procedures -  Recommend for the right with biopsy-proven Paget's disease and given the evidence for subareolar malignancy would consider with the diagnosis of Paget's highly suspicious for additional DCIS vs Invasive Cancer.  [place] Lymph Node Biopsy  Technetium-99 - a clear substance injected around the nipple and areola on the day of surgery prior to surgery starting which provides a sound "road" map to the lymph nodes needing to be removed for further examination.  Blue Dye - either Methylene Blue or Isosulfan is given in the operating room and provides a color "road" map to the lymph nodes needing to be removed for further examination.  MagTrace (Superparamagnetic oxide) - is used to assist with lymph node mapping as well.  Risk - axillary swelling related to bleeding or serous fluid, infection, nerve damage (temporary or permanent), lymphedema (5-6% risk), additional surgery related to final pathology or complications from the procedure  Axillary Lymph Node Dissection  Risk including  the above plus nerve injury which could be permanent and lymphedema risk above 20-25%     Reconstruction Procedures  Implant- based  Autologous-based  Combination  Immediate " versus Delayed  Oncoplastic reduction     The pros and cons of these reconstructive methods were addressed. A second operation maybe required before the final completion of the reconstructive process. I also explained to the patient that the reconstructive options are generally by law required to be covered by insurance and would be considered part of a cancer operation and not a cosmetic operation.     Radiation Treatment Options:  Whole-Breast  Partial Breast      Systemic treatment options:  Hormone Blocker/Endocrine Therapy  Tamoxifen  Raloxifene  Aromatase Inhibitor   Letrozole (Femara)   Anastrozole (Arimidex)   Exemestane (Aromasin)     2. Chemotherapy     3. Immunomodulator & Target Therapies (such as Herceptin)       We discussed in-depth, should she proceed with additional biopsies the likely scenario for her right breast would not change. At a minimum, I would remove the NAC and the tissue deeper. I would also strongly consider SLNB. On the left it would be based on pathology from biopsy. If pathology revealed benign findings, we would need to consider possible additional biopsies if needed. This could lead us to observe the left or do additional surgery on the left.     After our lengthy discussion, we elected to proceed with bilateral simple mastectomy, right SLNB, possible ALND, and left intra-operative MagTrace injection. She desires reconstruction and will be set up to see Plastic Surgery Team.     Plan:     Problem List Items Addressed This Visit          Oncology    Paget's carcinoma of the nipple, right - Primary    Current Assessment & Plan     Surgery - Bilateral simple mastectomy wit right SLNB, possible ALND, and Left MagTrace injection intraoperatively.  Plastic Surgery Referral - re: post-mastectomy reconstruction options  Awaiting Prognostic/Receptors from recent punch biopsy  Preop - CBC, CMP, EKG  Surgical instructions and information were discussed in detail.         Relevant Orders     Ambulatory referral/consult to Plastic Surgery           Brett Yee M.D.   [facility] General Surgery - PGY1         All of questions were answered    Mercy Ya MD  Breast Surgical Oncology     OFFICE VISIT CODING:    Non-face-to-face time included:  Yes Preparing to see the patient such as reviewing the patient record  Yes Obtaining and reviewing separately obtained history  Yes Independently interpreting results  Yes Documenting clinical information in electronic health record  No Ordering appropriate medications  Yes Ordering appropriate tests  Yes Ordering appropriate procedures (including follow-up)  Yes Referring and communicating with other health care professionals (not separately reported)  Yes Care Coordination (not separately reported)    Face-to-face time included:  Yes Performing a medically necessary appropriate history, examination, and/or evaluation  Yes Communicating results to the patient/family/caregiver  Yes Counseling and educating the patient/family/caregiver  Yes Answering patient/family/caregiver questions    Total Time: 65 minutes    Total time includes both face-to-face and non-face-to-face time personally spent by myself on the day of the visit.

## 2024-09-23 NOTE — PROGRESS NOTES
[facility] - Breast [place] [facility]  Breast Surgical Oncology  New Patient Office Visit - H&P      Referring Provider: No ref. provider found   PCP: No, Primary Doctor     Patient Care Team:  No, Primary Doctor as PCP - Bekah Higgins, RN as Registered Nurse      Chief Complaint:   Chief Complaint   Patient presents with    Breast Cancer     Patient reports no breast related concerns         Subjective:       HPI:  Kaelyn Espinoza is a 59 y.o. female who presents on 9/26/2024 for evaluation of newly diagnosed right  breast cancer.    She presented on 9/3/2024 for evaluation of right nipple inversion and erythema around nipple areola complex that started about 1 year ago. Patient had not undergone mammogram in many years. A punch biopsy was done at that time on the suspicious site which was found to be Paget's Disease.    A detailed patient history was obtained and reviewed. She currently denies any breast issues including rashes, redness, pain, swelling, nipple discharge, or new lumps/masses.    MG breast density:     Imaging:    MRI Breast w/wo Contrast, w/CAD, Bilateral  9/18/2024  FINDINGS: There is mild background parenchymal enhancement. The breasts have heterogeneous fibroglandular tissue.   1) RIGHT BREAST:  The right nipple-areolar complex is thickened and demonstrates diffuse enhancement with flattening of the right nipple. In the upper-outer quadrant of the right breast, middle to posterior depths, there is heterogeneous, regional non mass enhancement which spans 45 mm AP x 20 mm transverse x 46 mm craniocaudal.  In the 11:00 subareolar right breast, anterior depth, there is a 10 x 7 x 8 mm oval, nonenhancing mass with circumscribed margins which corresponds to the sonographic finding in this region.  This mass is T1 bright and likely reflects blood products or proteinaceous fluid. No axillary or internal mammary lymphadenopathy is identified.    2) LEFT BREAST:  No suspicious areas  of enhancement or areas of architectural distortion are seen.  There is no skin thickening or nipple retraction.  No axillary or internal mammary lymphadenopathy is identified.   IMPRESSION: SUSPICIOUS OF MALIGNANCY 1. Thickening and diffuse enhancement of the right nipple-areolar complex with flattening of the right nipple corresponds to the biopsy-proven Paget's disease of the nipple.  2. Heterogeneous, regional non mass enhancement in the upper-outer quadrant of the right breast, middle to posterior depths, spanning up to 46 mm is suspicious.  3. Oval, nonenhancing T1 bright mass with circumscribed margins in the 11:00 subareolar right breast is suspicious.   RECOMMENDATIONS:   If breast conservation is being considered, recommend a right breast tomosynthesis/stereotactic guided core needle biopsy of the focal asymmetry in the 10:00 right breast, middle depth, which partially correlates with the non mass enhancement seen in this region as well as a right breast ultrasound-guided core needle biopsy of the mass in the 11:00 subareolar right breast. MRI BI-RADS: 4 Suspicious    BL DG MG w/ FRANCO US Breast Left Limited, US Breast Right Complete  8/29/2024  1) BREAST COMPOSITION: The breasts are heterogeneously dense, which may obscure small masses.  No suspicious masses, calcifications, or other signs of malignancy are identified.  The suspected asymmetries in the superior breasts bilaterally, middle depths, efface into normal fibroglandular tissue on the spot compression tomosynthesis images and are thus considered benign.    2) ULTRASOUND FINDINGS: High frequency real-time ultrasound evaluation was performed for further evaluation. Complete ultrasound evaluation of the right breast, including all 4 quadrants, subareolar regions, and axilla and targeted ultrasound evaluation of the superior and subareolar left breast and left axilla was performed.  In the 6:00 periareolar right breast there is an 8 x 2 x 3 mm oval,  hypoechoic intraductal mass versus debris. In the 11:00 subareolar right breast there is an 11 x 3 x 8 mm oval, hypoechoic intraductal mass versus debris.  In the 6:00 subareolar left breast there is a 10 x 5 x 5 mm oval, hypoechoic intraductal mass/debris.  Focal shadowing is noted in the 8:00 right breast, 4 cm from the nipple, 9:00 right breast, 6 cm from the nipple, and 12:00 right breast, 2 cm from the nipple, though no discrete mass is seen throughout the right breast except for the aforementioned masses.  Benign lymph nodes are noted in the bilateral axillae.  3) IMPRESSION: INCOMPLETE: NEEDS ADDITIONAL IMAGING EVALUATION         Pathology:  Right Nippe Punch Bippsy  2024  PAGET'S DISEASE OF THE NIPPLE.   Receptor status pending  ER   SD  HER2  Ki-67      OB/GYN History:  Age at Menarche Onset: 12  Menopausal Status: postmenopausal, LMP: No LMP recorded (lmp unknown). Patient is postmenopausal.  Hysterectomy/Oophorectomy: menopause, at age 39  Hormonal birth control (duration): No none.   Pregnancy History:   Age at first live birth: 17  Hormone Replacement Therapy: No, none  Patient denies nipple discharge. Reports nipple inversion and skin changes.   Patient denies to previous breast biopsy.   Patient denies to a personal history of breast cancer.          Other Relevant History:  Prior thoracic RT: none  Genetic testing: none  Ashkenazi Hinduism descent: No    Family History:  Family History   Problem Relation Name Age of Onset    Hypertension Mother      Heart disease Father      Breast cancer Niece  30 - 40        Past History:  History reviewed. No pertinent past medical history.     Past Surgical History:   Procedure Laterality Date    BREAST BIOPSY      BUNIONECTOMY      HERNIA REPAIR      KNEE SURGERY      TONSILLECTOMY          Social History     Socioeconomic History    Marital status: Single   Tobacco Use    Smoking status: Never    Smokeless tobacco: Never   Substance and Sexual Activity  "   Alcohol use: Yes     Comment: socially    Drug use: Never        Body mass index is 21.97 kg/m².     Allergy/Medications:   Review of patient's allergies indicates:  No Known Allergies     No current outpatient medications on file.         Review of Systems:  Review of Systems   Constitutional:  Negative for chills, fever and weight loss.   Eyes:  Negative for blurred vision.   Respiratory:  Negative for shortness of breath.    Cardiovascular:  Negative for chest pain and leg swelling.   Gastrointestinal:  Negative for abdominal pain, heartburn, nausea and vomiting.   Genitourinary:  Negative for dysuria and frequency.   Musculoskeletal:  Negative for joint pain.   Neurological:  Positive for tingling. Negative for dizziness and headaches.         Tingling in left arm, wakes her up in the middle of night, improves with baby aspirin   Endo/Heme/Allergies:  Does not bruise/bleed easily.          Objective:     Vitals:  Blood pressure 102/64, pulse 70, temperature 97.9 °F (36.6 °C), temperature source Oral, resp. rate 18, height 5' 3" (1.6 m), weight 56.2 kg (124 lb), SpO2 99%.      Physical Exam:  General: The patient is awake, alert and oriented times three. The patient is well nourished and in no acute distress.   Neck: There is no evidence of palpable cervical, supraclavicular or axillary adenopathy. The neck is supple. The thyroid is not enlarged.   Musculoskeletal: The patient has a normal range of motion of her bilateral upper extremities.   Chest: Examination of the chest wall fails to reveal any obvious abnormalities. The lungs are clear to auscultation bilaterally without rales, rhonchi, or wheezing.   Cardiovascular: The heart has a regular rate and rhythm without murmurs, gallops or rubs.  Breast:  Physical Exam  Chest:   Breasts:     Right: Inverted nipple, mass and skin change present. No swelling, bleeding, nipple discharge or tenderness.      Left: Mass present. No swelling, bleeding, inverted " nipple, nipple discharge, skin change or tenderness.          Comments: No skin dimpling bilaterally      Abdomen: The abdomen is soft, flat, nontender and nondistended with no palpable masses or organomegaly.  Integumentary: no rashes or skin lesions present  Neurologic: cranial nerves intact, no signs of peripheral neurological deficit, motor/sensory function intact        Assessment and Discussion:      Cancer Staging   Paget's carcinoma of the nipple, right  Staging form: Breast, AJCC 8th Edition  - Clinical stage from 9/26/2024: Stage 0 (cTis (Paget), cN0, cM0, ER: Unknown, SC: Unknown, HER2: Unknown) - Unsigned        Encounter Diagnoses   Name Primary?    Paget's carcinoma of the nipple, right Yes      We discussed her imaging and pathology results in detail. Given the pathology revealing Paget's and the suspicion for additional malignant findings subareolar on MRI. We had a detailed discussion regarding her treatment options.     We discussed the need to proceed with local and systemic treatment.      Local Surgical Treatment options:     Breast Surgical Procedures - This would require additional biopsies of both the right and left breast  Breast Conservation Surgery (Partial Mastectomy or Lumpectomy)  Palpation-Guided - removal of breast cancer which is felt on clinical exam  Localization-Guided - required placement of a MagSeed and/or wire (if needed) in the area of concern to allow for identification during surgery. This will take place prior to surgery (usually a few days before). During surgery the MagSeed is detected with a probe and the cancer area is removed along with the MagSeed and/or previously placed clip.   Incisions are usually closed with dissolving stitches, followed by glue and Dermabond.   Mastectomy - Could be completed for both breasts  Simple - includes removal of breast skin, subcutaneous (fat) tissue, and nipple areolar complex.   Skin-Sparing - includes removal of the breast skin  "while sparing enough for reconstruction. It also is involves removal of the subcutaneous (fat) tissue and nipple areolar complex.  Nipple Sparing - includes removal of all the breast tissue underneath the nipple, areola, and breast skin is removed. The tissue beneath the nipple and areola are checked for cancer. If cancer is detected, the nipple and areola are then removed.   Surgical Risk include surgical site infections, hematoma or seroma requiring operation, necrosis of nipple-areola and/or mastectomy flaps requiring debridement or hyperbaric therapy, unplanned re-operations, and delay in adjuvant treatments.    Drain placement may be necessary after mastectomy and can remain in place for greater than 10-14 day, occasionally longer.     Axillary Surgical Procedures -  Recommend for the right with biopsy-proven Paget's disease and given the evidence for subareolar malignancy would consider with the diagnosis of Paget's highly suspicious for additional DCIS vs Invasive Cancer.  [place] Lymph Node Biopsy  Technetium-99 - a clear substance injected around the nipple and areola on the day of surgery prior to surgery starting which provides a sound "road" map to the lymph nodes needing to be removed for further examination.  Blue Dye - either Methylene Blue or Isosulfan is given in the operating room and provides a color "road" map to the lymph nodes needing to be removed for further examination.  MagTrace (Superparamagnetic oxide) - is used to assist with lymph node mapping as well.  Risk - axillary swelling related to bleeding or serous fluid, infection, nerve damage (temporary or permanent), lymphedema (5-6% risk), additional surgery related to final pathology or complications from the procedure  Axillary Lymph Node Dissection  Risk including  the above plus nerve injury which could be permanent and lymphedema risk above 20-25%     Reconstruction Procedures  Implant- based  Autologous-based  Combination  Immediate " versus Delayed  Oncoplastic reduction     The pros and cons of these reconstructive methods were addressed. A second operation maybe required before the final completion of the reconstructive process. I also explained to the patient that the reconstructive options are generally by law required to be covered by insurance and would be considered part of a cancer operation and not a cosmetic operation.     Radiation Treatment Options:  Whole-Breast  Partial Breast      Systemic treatment options:  Hormone Blocker/Endocrine Therapy  Tamoxifen  Raloxifene  Aromatase Inhibitor   Letrozole (Femara)   Anastrozole (Arimidex)   Exemestane (Aromasin)     2. Chemotherapy     3. Immunomodulator & Target Therapies (such as Herceptin)       We discussed in-depth, should she proceed with additional biopsies the likely scenario for her right breast would not change. At a minimum, I would remove the NAC and the tissue deeper. I would also strongly consider SLNB. On the left it would be based on pathology from biopsy. If pathology revealed benign findings, we would need to consider possible additional biopsies if needed. This could lead us to observe the left or do additional surgery on the left.     After our lengthy discussion, we elected to proceed with bilateral simple mastectomy, right SLNB, possible ALND, and left intra-operative MagTrace injection. She desires reconstruction and will be set up to see Plastic Surgery Team.     Plan:     Problem List Items Addressed This Visit          Oncology    Paget's carcinoma of the nipple, right - Primary    Current Assessment & Plan     Surgery - Bilateral simple mastectomy wit right SLNB, possible ALND, and Left MagTrace injection intraoperatively.  Plastic Surgery Referral - re: post-mastectomy reconstruction options  Awaiting Prognostic/Receptors from recent punch biopsy  Preop - CBC, CMP, EKG  Surgical instructions and information were discussed in detail.         Relevant Orders     Ambulatory referral/consult to Plastic Surgery           Brett Yee M.D.   [facility] General Surgery - PGY1         All of questions were answered    Mercy Ya MD  Breast Surgical Oncology     OFFICE VISIT CODING:    Non-face-to-face time included:  Yes Preparing to see the patient such as reviewing the patient record  Yes Obtaining and reviewing separately obtained history  Yes Independently interpreting results  Yes Documenting clinical information in electronic health record  No Ordering appropriate medications  Yes Ordering appropriate tests  Yes Ordering appropriate procedures (including follow-up)  Yes Referring and communicating with other health care professionals (not separately reported)  Yes Care Coordination (not separately reported)    Face-to-face time included:  Yes Performing a medically necessary appropriate history, examination, and/or evaluation  Yes Communicating results to the patient/family/caregiver  Yes Counseling and educating the patient/family/caregiver  Yes Answering patient/family/caregiver questions    Total Time: 65 minutes    Total time includes both face-to-face and non-face-to-face time personally spent by myself on the day of the visit.

## 2024-09-26 ENCOUNTER — OFFICE VISIT (OUTPATIENT)
Dept: SURGERY | Facility: CLINIC | Age: 59
End: 2024-09-26
Payer: COMMERCIAL

## 2024-09-26 VITALS
DIASTOLIC BLOOD PRESSURE: 64 MMHG | HEIGHT: 63 IN | HEART RATE: 70 BPM | BODY MASS INDEX: 21.97 KG/M2 | RESPIRATION RATE: 18 BRPM | TEMPERATURE: 98 F | WEIGHT: 124 LBS | SYSTOLIC BLOOD PRESSURE: 102 MMHG | OXYGEN SATURATION: 99 %

## 2024-09-26 DIAGNOSIS — C50.011 PAGET'S CARCINOMA OF THE NIPPLE, RIGHT: Primary | ICD-10-CM

## 2024-09-26 PROCEDURE — 3008F BODY MASS INDEX DOCD: CPT | Mod: CPTII,S$GLB,, | Performed by: SURGERY

## 2024-09-26 PROCEDURE — 99999 PR PBB SHADOW E&M-EST. PATIENT-LVL IV: CPT | Mod: PBBFAC,,, | Performed by: SURGERY

## 2024-09-26 PROCEDURE — 3078F DIAST BP <80 MM HG: CPT | Mod: CPTII,S$GLB,, | Performed by: SURGERY

## 2024-09-26 PROCEDURE — 99215 OFFICE O/P EST HI 40 MIN: CPT | Mod: S$GLB,,, | Performed by: SURGERY

## 2024-09-26 PROCEDURE — 1160F RVW MEDS BY RX/DR IN RCRD: CPT | Mod: CPTII,S$GLB,, | Performed by: SURGERY

## 2024-09-26 PROCEDURE — 1159F MED LIST DOCD IN RCRD: CPT | Mod: CPTII,S$GLB,, | Performed by: SURGERY

## 2024-09-26 PROCEDURE — 3074F SYST BP LT 130 MM HG: CPT | Mod: CPTII,S$GLB,, | Performed by: SURGERY

## 2024-09-27 ENCOUNTER — TELEPHONE (OUTPATIENT)
Dept: SURGERY | Facility: CLINIC | Age: 59
End: 2024-09-27
Payer: COMMERCIAL

## 2024-09-27 NOTE — TELEPHONE ENCOUNTER
Plastics appointment scheduled with Dr. Manuel on 10/2/2024 at 1100. Patient is  aware of this appointment.   Continue meds per pain management specialist

## 2024-09-27 NOTE — NURSING
Breast Nurse Navigator Note    Distress Screening Tool  Distress Score    Distress Score: 5    Met with patient after consult with Dr. Ya.  Referred to the following outside resources: [employer] and [facility]. Verbalized understanding that these resources may provide support throughout the course of her treatment. Patient amenable to receiving additional support and gave her permission to be referred to these organizations.   Breast Cancer Education: Breast Cancer Binder given to the patient.   All questions answers to the patient's satisfaction.

## 2024-09-28 NOTE — ASSESSMENT & PLAN NOTE
Surgery - Bilateral simple mastectomy wit right SLNB, possible ALND, and Left MagTrace injection intraoperatively.  Plastic Surgery Referral - re: post-mastectomy reconstruction options  Awaiting Prognostic/Receptors from recent punch biopsy  Preop - CBC, CMP, EKG  Surgical instructions and information were discussed in detail.

## 2024-10-07 ENCOUNTER — TELEPHONE (OUTPATIENT)
Dept: SURGERY | Facility: CLINIC | Age: 59
End: 2024-10-07
Payer: COMMERCIAL

## 2024-10-08 ENCOUNTER — DOCUMENTATION ONLY (OUTPATIENT)
Dept: SURGERY | Facility: CLINIC | Age: 59
End: 2024-10-08
Payer: COMMERCIAL

## 2024-10-08 ENCOUNTER — LAB VISIT (OUTPATIENT)
Dept: LAB | Facility: HOSPITAL | Age: 59
End: 2024-10-08
Attending: SURGERY
Payer: COMMERCIAL

## 2024-10-08 DIAGNOSIS — C50.011 PAGET'S CARCINOMA OF THE NIPPLE, RIGHT: Primary | ICD-10-CM

## 2024-10-08 DIAGNOSIS — C50.011 PAGET'S CARCINOMA OF THE NIPPLE, RIGHT: ICD-10-CM

## 2024-10-08 DIAGNOSIS — C50.911: ICD-10-CM

## 2024-10-08 LAB
ALBUMIN SERPL-MCNC: 4.1 G/DL (ref 3.4–4.8)
ALBUMIN/GLOB SERPL: 1.6 RATIO (ref 1.1–2)
ALP SERPL-CCNC: 75 UNIT/L (ref 40–150)
ALT SERPL-CCNC: 24 UNIT/L (ref 0–55)
ANION GAP SERPL CALC-SCNC: 7 MEQ/L
AST SERPL-CCNC: 22 UNIT/L (ref 5–34)
BASOPHILS # BLD AUTO: 0.09 X10(3)/MCL
BASOPHILS NFR BLD AUTO: 1.8 %
BILIRUB SERPL-MCNC: 0.5 MG/DL
BUN SERPL-MCNC: 16.1 MG/DL (ref 9.8–20.1)
CALCIUM SERPL-MCNC: 9.5 MG/DL (ref 8.4–10.2)
CHLORIDE SERPL-SCNC: 109 MMOL/L (ref 98–107)
CO2 SERPL-SCNC: 27 MMOL/L (ref 23–31)
CREAT SERPL-MCNC: 0.85 MG/DL (ref 0.55–1.02)
CREAT/UREA NIT SERPL: 19
EOSINOPHIL # BLD AUTO: 0.16 X10(3)/MCL (ref 0–0.9)
EOSINOPHIL NFR BLD AUTO: 3.1 %
ERYTHROCYTE [DISTWIDTH] IN BLOOD BY AUTOMATED COUNT: 13.4 % (ref 11.5–17)
GFR SERPLBLD CREATININE-BSD FMLA CKD-EPI: >60 ML/MIN/1.73/M2
GLOBULIN SER-MCNC: 2.5 GM/DL (ref 2.4–3.5)
GLUCOSE SERPL-MCNC: 47 MG/DL (ref 82–115)
HCT VFR BLD AUTO: 41 % (ref 37–47)
HGB BLD-MCNC: 13.3 G/DL (ref 12–16)
IMM GRANULOCYTES # BLD AUTO: 0.01 X10(3)/MCL (ref 0–0.04)
IMM GRANULOCYTES NFR BLD AUTO: 0.2 %
LYMPHOCYTES # BLD AUTO: 1.69 X10(3)/MCL (ref 0.6–4.6)
LYMPHOCYTES NFR BLD AUTO: 33 %
MCH RBC QN AUTO: 30.5 PG (ref 27–31)
MCHC RBC AUTO-ENTMCNC: 32.4 G/DL (ref 33–36)
MCV RBC AUTO: 94 FL (ref 80–94)
MONOCYTES # BLD AUTO: 0.7 X10(3)/MCL (ref 0.1–1.3)
MONOCYTES NFR BLD AUTO: 13.7 %
NEUTROPHILS # BLD AUTO: 2.47 X10(3)/MCL (ref 2.1–9.2)
NEUTROPHILS NFR BLD AUTO: 48.2 %
NRBC BLD AUTO-RTO: 0 %
OHS QRS DURATION: 92 MS
OHS QTC CALCULATION: 414 MS
PLATELET # BLD AUTO: 255 X10(3)/MCL (ref 130–400)
PMV BLD AUTO: 11.7 FL (ref 7.4–10.4)
POTASSIUM SERPL-SCNC: 4.6 MMOL/L (ref 3.5–5.1)
PROT SERPL-MCNC: 6.6 GM/DL (ref 5.8–7.6)
RBC # BLD AUTO: 4.36 X10(6)/MCL (ref 4.2–5.4)
SODIUM SERPL-SCNC: 143 MMOL/L (ref 136–145)
WBC # BLD AUTO: 5.12 X10(3)/MCL (ref 4.5–11.5)

## 2024-10-08 PROCEDURE — 36415 COLL VENOUS BLD VENIPUNCTURE: CPT

## 2024-10-08 PROCEDURE — 93010 ELECTROCARDIOGRAM REPORT: CPT | Mod: ,,, | Performed by: INTERNAL MEDICINE

## 2024-10-08 PROCEDURE — 93005 ELECTROCARDIOGRAM TRACING: CPT

## 2024-10-08 RX ORDER — SODIUM CHLORIDE, SODIUM LACTATE, POTASSIUM CHLORIDE, CALCIUM CHLORIDE 600; 310; 30; 20 MG/100ML; MG/100ML; MG/100ML; MG/100ML
INJECTION, SOLUTION INTRAVENOUS CONTINUOUS
OUTPATIENT
Start: 2024-10-08

## 2024-10-08 RX ORDER — CEFAZOLIN SODIUM 2 G/50ML
2 SOLUTION INTRAVENOUS
OUTPATIENT
Start: 2024-10-08

## 2024-10-08 NOTE — PROGRESS NOTES
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

## 2024-10-08 NOTE — PROGRESS NOTES
SYED called with critical Glucose level for pt of 46 that was drawn around 11:00am today. I called and spoke with the patient at 1:33pm to check on her and see how she was feeling. She stated that she walked 2 miles and had ann,eggs, grits,and waffle for breakfast prior to drawing her blood. She stated that she feels good with no signs/symptoms of hypoglycemia. Dr. Ya informed with no orders at this time.  TC

## 2024-10-11 RX ORDER — CEFAZOLIN SODIUM 1 G/3ML
1 INJECTION, POWDER, FOR SOLUTION INTRAMUSCULAR; INTRAVENOUS
Status: CANCELLED | OUTPATIENT
Start: 2024-10-11 | End: 2024-10-11

## 2024-10-13 ENCOUNTER — ANESTHESIA EVENT (OUTPATIENT)
Dept: SURGERY | Facility: HOSPITAL | Age: 59
End: 2024-10-13
Payer: COMMERCIAL

## 2024-10-13 RX ORDER — GLUCAGON 1 MG
1 KIT INJECTION
OUTPATIENT
Start: 2024-10-13

## 2024-10-14 ENCOUNTER — ANESTHESIA (OUTPATIENT)
Dept: SURGERY | Facility: HOSPITAL | Age: 59
End: 2024-10-14
Payer: COMMERCIAL

## 2024-10-14 ENCOUNTER — HOSPITAL ENCOUNTER (OUTPATIENT)
Facility: HOSPITAL | Age: 59
Discharge: HOME | End: 2024-10-15
Attending: SURGERY | Admitting: SURGERY
Payer: COMMERCIAL

## 2024-10-14 ENCOUNTER — HOSPITAL ENCOUNTER (OUTPATIENT)
Dept: RADIOLOGY | Facility: HOSPITAL | Age: 59
Discharge: HOME | End: 2024-10-14
Attending: SURGERY | Admitting: SURGERY
Payer: COMMERCIAL

## 2024-10-14 DIAGNOSIS — C50.911: ICD-10-CM

## 2024-10-14 DIAGNOSIS — R92.8 ABNORMAL MAMMOGRAM: ICD-10-CM

## 2024-10-14 DIAGNOSIS — C50.919 BREAST CANCER: ICD-10-CM

## 2024-10-14 DIAGNOSIS — C50.011 PAGET'S CARCINOMA OF THE NIPPLE, RIGHT: Primary | ICD-10-CM

## 2024-10-14 LAB — POCT GLUCOSE: 85 MG/DL (ref 70–110)

## 2024-10-14 PROCEDURE — 63600175 PHARM REV CODE 636 W HCPCS: Performed by: ANESTHESIOLOGY

## 2024-10-14 PROCEDURE — 27201423 OPTIME MED/SURG SUP & DEVICES STERILE SUPPLY: Performed by: SURGERY

## 2024-10-14 PROCEDURE — 25000003 PHARM REV CODE 250: Performed by: NURSE ANESTHETIST, CERTIFIED REGISTERED

## 2024-10-14 PROCEDURE — 71000033 HC RECOVERY, INTIAL HOUR: Performed by: SURGERY

## 2024-10-14 PROCEDURE — 19303 MAST SIMPLE COMPLETE: CPT | Mod: AS,RT,, | Performed by: PHYSICIAN ASSISTANT

## 2024-10-14 PROCEDURE — 71000039 HC RECOVERY, EACH ADD'L HOUR: Performed by: SURGERY

## 2024-10-14 PROCEDURE — 25000003 PHARM REV CODE 250: Performed by: SURGERY

## 2024-10-14 PROCEDURE — A9541 TC99M SULFUR COLLOID: HCPCS | Performed by: SURGERY

## 2024-10-14 PROCEDURE — 71000016 HC POSTOP RECOV ADDL HR: Performed by: SURGERY

## 2024-10-14 PROCEDURE — 36000706: Performed by: SURGERY

## 2024-10-14 PROCEDURE — 19303 MAST SIMPLE COMPLETE: CPT | Mod: RT,,, | Performed by: SURGERY

## 2024-10-14 PROCEDURE — 37000009 HC ANESTHESIA EA ADD 15 MINS: Performed by: SURGERY

## 2024-10-14 PROCEDURE — 82962 GLUCOSE BLOOD TEST: CPT | Performed by: SURGERY

## 2024-10-14 PROCEDURE — 71000015 HC POSTOP RECOV 1ST HR: Performed by: SURGERY

## 2024-10-14 PROCEDURE — 38525 BIOPSY/REMOVAL LYMPH NODES: CPT | Mod: 51,RT,, | Performed by: SURGERY

## 2024-10-14 PROCEDURE — C1789 PROSTHESIS, BREAST, IMP: HCPCS | Performed by: SURGERY

## 2024-10-14 PROCEDURE — 63600175 PHARM REV CODE 636 W HCPCS: Performed by: SURGERY

## 2024-10-14 PROCEDURE — C1729 CATH, DRAINAGE: HCPCS | Performed by: SURGERY

## 2024-10-14 PROCEDURE — 63600175 PHARM REV CODE 636 W HCPCS: Mod: JZ,JG | Performed by: SURGERY

## 2024-10-14 PROCEDURE — 36000707: Performed by: SURGERY

## 2024-10-14 PROCEDURE — 37000008 HC ANESTHESIA 1ST 15 MINUTES: Performed by: SURGERY

## 2024-10-14 PROCEDURE — 25000003 PHARM REV CODE 250: Performed by: ANESTHESIOLOGY

## 2024-10-14 PROCEDURE — C1819 TISSUE LOCALIZATION-EXCISION: HCPCS | Performed by: SURGERY

## 2024-10-14 PROCEDURE — 76098 X-RAY EXAM SURGICAL SPECIMEN: CPT | Mod: TC

## 2024-10-14 PROCEDURE — 38900 IO MAP OF SENT LYMPH NODE: CPT | Mod: RT,,, | Performed by: SURGERY

## 2024-10-14 PROCEDURE — 63600175 PHARM REV CODE 636 W HCPCS: Performed by: NURSE ANESTHETIST, CERTIFIED REGISTERED

## 2024-10-14 DEVICE — TISSUE ALLODERM SELECT 1.2-2.0: Type: IMPLANTABLE DEVICE | Site: BREAST | Status: FUNCTIONAL

## 2024-10-14 DEVICE — NATRELLE TE SMOOTH 133S-FX-12-T (US)
Type: IMPLANTABLE DEVICE | Site: BREAST | Status: FUNCTIONAL
Brand: NATRELLE 133S TISSUE EXPANDERS

## 2024-10-14 RX ORDER — METHYLENE BLUE 5 MG/ML
INJECTION INTRAVENOUS
Status: DISPENSED
Start: 2024-10-14 | End: 2024-10-14

## 2024-10-14 RX ORDER — MIDAZOLAM HYDROCHLORIDE 2 MG/2ML
2 INJECTION, SOLUTION INTRAMUSCULAR; INTRAVENOUS ONCE AS NEEDED
Status: COMPLETED | OUTPATIENT
Start: 2024-10-14 | End: 2024-10-14

## 2024-10-14 RX ORDER — SODIUM CHLORIDE 9 MG/ML
INJECTION, SOLUTION INTRAVENOUS CONTINUOUS
Status: DISCONTINUED | OUTPATIENT
Start: 2024-10-14 | End: 2024-10-15 | Stop reason: HOSPADM

## 2024-10-14 RX ORDER — CEFAZOLIN SODIUM 1 G/3ML
2 INJECTION, POWDER, FOR SOLUTION INTRAMUSCULAR; INTRAVENOUS ONCE
Status: COMPLETED | OUTPATIENT
Start: 2024-10-14 | End: 2024-10-14

## 2024-10-14 RX ORDER — CEFAZOLIN SODIUM 1 G/3ML
INJECTION, POWDER, FOR SOLUTION INTRAMUSCULAR; INTRAVENOUS
Status: DISPENSED
Start: 2024-10-14 | End: 2024-10-14

## 2024-10-14 RX ORDER — TECHNETIUM TC 99M SULFUR COLLOID 2 MG
1.1 KIT MISCELLANEOUS
Status: COMPLETED | OUTPATIENT
Start: 2024-10-14 | End: 2024-10-14

## 2024-10-14 RX ORDER — FENTANYL CITRATE 50 UG/ML
INJECTION, SOLUTION INTRAMUSCULAR; INTRAVENOUS
Status: DISCONTINUED | OUTPATIENT
Start: 2024-10-14 | End: 2024-10-14

## 2024-10-14 RX ORDER — GENTAMICIN 40 MG/ML
INJECTION, SOLUTION INTRAMUSCULAR; INTRAVENOUS
Status: DISPENSED
Start: 2024-10-14 | End: 2024-10-14

## 2024-10-14 RX ORDER — LIDOCAINE HYDROCHLORIDE 10 MG/ML
INJECTION, SOLUTION EPIDURAL; INFILTRATION; INTRACAUDAL; PERINEURAL
Status: DISCONTINUED | OUTPATIENT
Start: 2024-10-14 | End: 2024-10-14

## 2024-10-14 RX ORDER — GABAPENTIN 300 MG/1
300 CAPSULE ORAL
Status: COMPLETED | OUTPATIENT
Start: 2024-10-14 | End: 2024-10-14

## 2024-10-14 RX ORDER — INDOCYANINE GREEN AND WATER 25 MG
KIT INJECTION
Status: DISCONTINUED | OUTPATIENT
Start: 2024-10-14 | End: 2024-10-14

## 2024-10-14 RX ORDER — ROCURONIUM BROMIDE 10 MG/ML
INJECTION, SOLUTION INTRAVENOUS
Status: DISCONTINUED | OUTPATIENT
Start: 2024-10-14 | End: 2024-10-14

## 2024-10-14 RX ORDER — DOCUSATE SODIUM 100 MG/1
100 CAPSULE, LIQUID FILLED ORAL 2 TIMES DAILY
Status: DISCONTINUED | OUTPATIENT
Start: 2024-10-14 | End: 2024-10-15 | Stop reason: HOSPADM

## 2024-10-14 RX ORDER — ONDANSETRON HYDROCHLORIDE 2 MG/ML
4 INJECTION, SOLUTION INTRAVENOUS ONCE AS NEEDED
Status: DISCONTINUED | OUTPATIENT
Start: 2024-10-14 | End: 2024-10-14 | Stop reason: HOSPADM

## 2024-10-14 RX ORDER — HYDROMORPHONE HYDROCHLORIDE 2 MG/ML
1 INJECTION, SOLUTION INTRAMUSCULAR; INTRAVENOUS; SUBCUTANEOUS EVERY 4 HOURS PRN
Status: DISCONTINUED | OUTPATIENT
Start: 2024-10-14 | End: 2024-10-15 | Stop reason: HOSPADM

## 2024-10-14 RX ORDER — CEFAZOLIN SODIUM 1 G/3ML
INJECTION, POWDER, FOR SOLUTION INTRAMUSCULAR; INTRAVENOUS
Status: DISCONTINUED | OUTPATIENT
Start: 2024-10-14 | End: 2024-10-14 | Stop reason: HOSPADM

## 2024-10-14 RX ORDER — TRAMADOL HYDROCHLORIDE 50 MG/1
50 TABLET ORAL EVERY 4 HOURS PRN
Status: DISCONTINUED | OUTPATIENT
Start: 2024-10-14 | End: 2024-10-15 | Stop reason: HOSPADM

## 2024-10-14 RX ORDER — ISOSULFAN BLUE 50 MG/5ML
INJECTION, SOLUTION SUBCUTANEOUS
Status: DISPENSED
Start: 2024-10-14 | End: 2024-10-14

## 2024-10-14 RX ORDER — ONDANSETRON HYDROCHLORIDE 2 MG/ML
INJECTION, SOLUTION INTRAMUSCULAR; INTRAVENOUS
Status: DISCONTINUED | OUTPATIENT
Start: 2024-10-14 | End: 2024-10-14

## 2024-10-14 RX ORDER — MUPIROCIN 20 MG/G
1 OINTMENT TOPICAL 2 TIMES DAILY
Status: DISCONTINUED | OUTPATIENT
Start: 2024-10-14 | End: 2024-10-15 | Stop reason: HOSPADM

## 2024-10-14 RX ORDER — BUPIVACAINE HYDROCHLORIDE 5 MG/ML
INJECTION, SOLUTION EPIDURAL; INTRACAUDAL
Status: DISCONTINUED
Start: 2024-10-14 | End: 2024-10-14 | Stop reason: WASHOUT

## 2024-10-14 RX ORDER — CLINDAMYCIN PHOSPHATE 900 MG/50ML
900 INJECTION, SOLUTION INTRAVENOUS
Status: COMPLETED | OUTPATIENT
Start: 2024-10-14 | End: 2024-10-15

## 2024-10-14 RX ORDER — PROPOFOL 10 MG/ML
VIAL (ML) INTRAVENOUS
Status: DISCONTINUED | OUTPATIENT
Start: 2024-10-14 | End: 2024-10-14

## 2024-10-14 RX ORDER — HYDROMORPHONE HYDROCHLORIDE 2 MG/ML
0.4 INJECTION, SOLUTION INTRAMUSCULAR; INTRAVENOUS; SUBCUTANEOUS EVERY 5 MIN PRN
Status: DISCONTINUED | OUTPATIENT
Start: 2024-10-14 | End: 2024-10-14 | Stop reason: HOSPADM

## 2024-10-14 RX ORDER — ONDANSETRON 4 MG/1
4 TABLET, ORALLY DISINTEGRATING ORAL ONCE
Status: COMPLETED | OUTPATIENT
Start: 2024-10-14 | End: 2024-10-14

## 2024-10-14 RX ORDER — ACETAMINOPHEN 500 MG
1000 TABLET ORAL
Status: COMPLETED | OUTPATIENT
Start: 2024-10-14 | End: 2024-10-14

## 2024-10-14 RX ORDER — HYDROCODONE BITARTRATE AND ACETAMINOPHEN 5; 325 MG/1; MG/1
1 TABLET ORAL EVERY 4 HOURS PRN
Status: DISCONTINUED | OUTPATIENT
Start: 2024-10-14 | End: 2024-10-15 | Stop reason: HOSPADM

## 2024-10-14 RX ORDER — ISOSULFAN BLUE 50 MG/5ML
INJECTION, SOLUTION SUBCUTANEOUS
Status: DISCONTINUED | OUTPATIENT
Start: 2024-10-14 | End: 2024-10-14 | Stop reason: HOSPADM

## 2024-10-14 RX ORDER — PROMETHAZINE HYDROCHLORIDE 25 MG/1
25 TABLET ORAL EVERY 6 HOURS PRN
Status: DISCONTINUED | OUTPATIENT
Start: 2024-10-14 | End: 2024-10-15 | Stop reason: HOSPADM

## 2024-10-14 RX ORDER — DEXAMETHASONE SODIUM PHOSPHATE 4 MG/ML
INJECTION, SOLUTION INTRA-ARTICULAR; INTRALESIONAL; INTRAMUSCULAR; INTRAVENOUS; SOFT TISSUE
Status: DISCONTINUED | OUTPATIENT
Start: 2024-10-14 | End: 2024-10-14

## 2024-10-14 RX ORDER — EPHEDRINE SULFATE 50 MG/ML
INJECTION, SOLUTION INTRAVENOUS
Status: DISCONTINUED | OUTPATIENT
Start: 2024-10-14 | End: 2024-10-14

## 2024-10-14 RX ORDER — BUPIVACAINE HYDROCHLORIDE 5 MG/ML
INJECTION, SOLUTION EPIDURAL; INTRACAUDAL
Status: DISCONTINUED | OUTPATIENT
Start: 2024-10-14 | End: 2024-10-14 | Stop reason: HOSPADM

## 2024-10-14 RX ORDER — BUPIVACAINE HYDROCHLORIDE 5 MG/ML
INJECTION, SOLUTION EPIDURAL; INTRACAUDAL
Status: DISPENSED
Start: 2024-10-14 | End: 2024-10-14

## 2024-10-14 RX ORDER — SODIUM CHLORIDE, SODIUM LACTATE, POTASSIUM CHLORIDE, CALCIUM CHLORIDE 600; 310; 30; 20 MG/100ML; MG/100ML; MG/100ML; MG/100ML
INJECTION, SOLUTION INTRAVENOUS CONTINUOUS
Status: DISCONTINUED | OUTPATIENT
Start: 2024-10-14 | End: 2024-10-15 | Stop reason: HOSPADM

## 2024-10-14 RX ORDER — LIDOCAINE HYDROCHLORIDE 10 MG/ML
1 INJECTION, SOLUTION EPIDURAL; INFILTRATION; INTRACAUDAL; PERINEURAL ONCE
Status: DISCONTINUED | OUTPATIENT
Start: 2024-10-14 | End: 2024-10-14 | Stop reason: HOSPADM

## 2024-10-14 RX ADMIN — PROPOFOL 120 MG: 10 INJECTION, EMULSION INTRAVENOUS at 10:10

## 2024-10-14 RX ADMIN — DEXAMETHASONE SODIUM PHOSPHATE 4 MG: 4 INJECTION, SOLUTION INTRA-ARTICULAR; INTRALESIONAL; INTRAMUSCULAR; INTRAVENOUS; SOFT TISSUE at 11:10

## 2024-10-14 RX ADMIN — ACETAMINOPHEN 1000 MG: 500 TABLET, FILM COATED ORAL at 08:10

## 2024-10-14 RX ADMIN — HYDROMORPHONE HYDROCHLORIDE 0.4 MG: 2 INJECTION INTRAMUSCULAR; INTRAVENOUS; SUBCUTANEOUS at 02:10

## 2024-10-14 RX ADMIN — SODIUM CHLORIDE: 9 INJECTION, SOLUTION INTRAVENOUS at 03:10

## 2024-10-14 RX ADMIN — EPHEDRINE SULFATE 10 MG: 50 INJECTION INTRAVENOUS at 10:10

## 2024-10-14 RX ADMIN — EPHEDRINE SULFATE 15 MG: 50 INJECTION INTRAVENOUS at 10:10

## 2024-10-14 RX ADMIN — FENTANYL CITRATE 50 MCG: 50 INJECTION, SOLUTION INTRAMUSCULAR; INTRAVENOUS at 11:10

## 2024-10-14 RX ADMIN — ONDANSETRON 4 MG: 2 INJECTION INTRAMUSCULAR; INTRAVENOUS at 01:10

## 2024-10-14 RX ADMIN — EPHEDRINE SULFATE 15 MG: 50 INJECTION INTRAVENOUS at 12:10

## 2024-10-14 RX ADMIN — DOCUSATE SODIUM 100 MG: 100 CAPSULE, LIQUID FILLED ORAL at 09:10

## 2024-10-14 RX ADMIN — LIDOCAINE HYDROCHLORIDE 50 MG: 10 INJECTION, SOLUTION EPIDURAL; INFILTRATION; INTRACAUDAL; PERINEURAL at 10:10

## 2024-10-14 RX ADMIN — CEFAZOLIN 2 G: 330 INJECTION, POWDER, FOR SOLUTION INTRAMUSCULAR; INTRAVENOUS at 10:10

## 2024-10-14 RX ADMIN — ROCURONIUM BROMIDE 40 MG: 10 INJECTION, SOLUTION INTRAVENOUS at 10:10

## 2024-10-14 RX ADMIN — EPHEDRINE SULFATE 10 MG: 50 INJECTION INTRAVENOUS at 01:10

## 2024-10-14 RX ADMIN — GABAPENTIN 300 MG: 300 CAPSULE ORAL at 08:10

## 2024-10-14 RX ADMIN — MIDAZOLAM HYDROCHLORIDE 2 MG: 1 INJECTION, SOLUTION INTRAMUSCULAR; INTRAVENOUS at 10:10

## 2024-10-14 RX ADMIN — TECHNETIUM TC 99M SULFUR COLLOID KIT 1.1 MILLICURIE: KIT at 02:10

## 2024-10-14 RX ADMIN — SODIUM CHLORIDE: 9 INJECTION, SOLUTION INTRAVENOUS at 10:10

## 2024-10-14 RX ADMIN — INDOCYANINE GREEN AND WATER 7.5 MG: KIT at 12:10

## 2024-10-14 RX ADMIN — CLINDAMYCIN PHOSPHATE 900 MG: 900 INJECTION, SOLUTION INTRAVENOUS at 05:10

## 2024-10-14 RX ADMIN — FENTANYL CITRATE 50 MCG: 50 INJECTION, SOLUTION INTRAMUSCULAR; INTRAVENOUS at 10:10

## 2024-10-14 RX ADMIN — ONDANSETRON 4 MG: 4 TABLET, ORALLY DISINTEGRATING ORAL at 08:10

## 2024-10-14 NOTE — PLAN OF CARE
VSS, hodan score   8, pt arousable and ready for transfer to [facility] per Dr Manuel. Transferred to room with 1L NC.

## 2024-10-14 NOTE — OP NOTE
DATE OF SURGERY:   10/14/24    SURGEON:  Kun Manuel MD    PREOP DIAGNOSIS:  Bilateral mastectomy defects.    POSTOP DIAGNOSIS:  Bilateral mastectomy defects.    PROCEDURE PERFORMED:    Bilateral placement of subpectoral tissue expanders.    Placement of bilateral acellular dermal matrix for breast reconstruction.    INDICATION FOR PROCEDURE:  This is a very pleasant female who is undergoing bilateral mastectomies.  She has opted to undergo breast reconstruction utilizing first-stage tissue expander.  She presents for this.    ANESTHESIA:  General.    COMPLICATIONS:  None    IMPLANTS:  Bilateral 450 cc Allergan style tissue expanders, extra full profile, initially filled to 200 cc in the OR.    PROCEDURE IN DETAIL:  The patient was endotracheally intubated, prepped and draped in the usual sterile fashion. Please note, Dr. Ya  began their portion of the procedure which was bilateral mastectomies.  Please see their separate operative note for complete details.  Once this was completed, I inspected the left-sided breast pocket and irrigated out with triple antibiotic solution.  I created a subpectoral pocket using the Bovie cautery, dissecting up to approximately the 9 o'clock position.  Once this was completed, I chose a 450 cc tissue expander, extra full profile, placed in a subpectoral position sewing down the suture tabs using 0 Vicryl suture.  A piece of contour perforated AlloDerm was then sewn down over the expander and sewn to the muscle using 0 Vicryl suture in interrupted running fashion for complete muscular and acellular dermal matrix coverage.  Once this was completed, a 15-[place] round Sylvester drain was left in the operative bed.  The skin was closed using interrupted and running 3-0 Monocryl. I then turned my attention to the right.      In a similar fashion, the pocket was irrigated out with triple antibiotic solution.  We then created a subpectoral pocket using Bovie cautery, lifting the  pectoralis major muscle proximally up to the 3 o'clock position.  We then chose a 450 cc tissue expander. Placed in subpectoral position.  Suture tabs sewn down using interrupted 0 Vicryl suture.  I then used a piece of contour perforated AlloDerm to cover the tissue expander and sewn into the pectoralis as well as recreate the inframammary fold using interrupted running 0 Vicryl suture.  Once this was completed, a 15-Greek round Sylvester drain was left in the operative bed.  The skin was closed using interrupted running 3-0 Monocryl. I then located the ports on the expander using a magnet, 200 cc of methylene blue-impregnated sterile saline solution was first instilled in the right expander under sterile technique, and then the left-sided expander was inflated to 200 cc with methylene blue impregnated saline.  Sterile dressing including a bra was applied.  There was no complication. I was scrubbed and present for the entire procedure.

## 2024-10-14 NOTE — ANESTHESIA PROCEDURE NOTES
Intubation    Date/Time: 10/14/2024 10:39 AM    Performed by: Susana Perez CRNA  Authorized by: Lukas Manuel MD    Intubation:     Induction:  Intravenous    Intubated:  Postinduction    Mask Ventilation:  Easy mask    Attempts:  1    Attempted By:  CRNA    Method of Intubation:  Direct    Blade:  Soto 3    Laryngeal View Grade: Grade IIA - cords partially seen      Difficult Airway Encountered?: No      Complications:  None    Airway Device:  Oral endotracheal tube    Airway Device Size:  6.5    Style/Cuff Inflation:  Cuffed (inflated to minimal occlusive pressure)    Inflation Amount (mL):  3    Tube secured:  21    Secured at:  The lips    Placement Verified By:  Capnometry    Complicating Factors:  None    Findings Post-Intubation:  BS equal bilateral  Notes:      Cosmetic dentistry, berna used. No oral trauma. Dentition intact as preop.

## 2024-10-14 NOTE — TRANSFER OF CARE
Anesthesia Transfer of Care Note    Patient: Kaelyn Espinoza    Procedure(s) Performed: Procedure(s) (LRB):  MASTECTOMY, SIMPLE // Bilateral (Bilateral)  BIOPSY, LYMPH NODE, SENTINEL // Right // Need Nuc Med Need Blue Dye Need True Node Need MagTrace to inject the left breast intra-operatively (Right)  LYMPHADENECTOMY, AXILLARY / Right (Right)  INSERTION, TISSUE EXPANDER, BREAST / Bilateral (Bilateral)  INSERTION, BIOLOGIC IMPLANT, FOR SOFT TISSUE REINFORCEMENT // alloderm (N/A)    Patient location: PACU    Anesthesia Type: general    Transport from OR: Transported from OR on room air with adequate spontaneous ventilation    Post pain: adequate analgesia    Post assessment: no apparent anesthetic complications    Post vital signs: stable    Level of consciousness: sedated    Nausea/Vomiting: no nausea/vomiting    Complications: none    Transfer of care protocol was followed

## 2024-10-14 NOTE — OP NOTE
DATE OF PROCEDURE: 10/14/2024    SURGEON: Korin Palacios M.D.    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 Left breast simple mastectomy    PROCEDURE IN DETAIL:   Kaelyn Espinoza is a 59 y.o. female brought to the operating room for definitive surgical management of Paget's disease of Right breast. The patient has elected to undergo bilateral simple mastectomy with right sentinel lymph node biopsy for aleena assessment.     My portion of the procedure includes only the simple left mastectomy.    The patient was informed of the possible risks and complications of the procedure, including but not limited to anesthetic risks, bleeding, infection, and need for additional surgery.  The patient concurred with the proposed plan, and has given informed consent.  The site of surgery was properly noted/marked in the preoperative holding area.    The patient was brought to the operating room and placed in the supine position with both upper extremities extended.  General anesthesia was administered. Perioperative antibiotics were administered and a time out was performed confirming the patient, site, and procedure. The bilateral chest and axilla was then prepped and draped in the usual sterile fashion.    The Left Mastectomy  The left simple mastectomy was performed concurrently with contralateral procedure. A large elliptical skin incision was made to left breast tissue that included the nipple-areolar complex. Flaps were raised in the avascular plane between subcutaneous tissue and breast tissue was used for to raise flaps from the clavicle superiorly, the sternum medially, the  anterior rectus sheath inferiorly, and the lateral border of the pectoralis major muscle laterally. Hemostasis was maintained in the flaps. Next, the breast tissue and underlying pectoralis fascia were excised from the pectoralis major muscle, progressing from medial to lateral. At the lateral border of the pectoralis major muscle, the breast tissue was sung laterally and a lateral pedicle identified where breast tissue gave way to fat of axilla. The lateral pedicle was incised, removed and the specimen was marked. Superiorly was a short suture and laterally was a long suture. The resulting mastectomy specimen was marked using a short stitch superiorly and a long stitch laterally. The breast was sent to pathology for permanent evaluation. The operative field was irrigated with normal saline and all bleeding points were secured with Bovie electrocautery.      Drain placement and incision closer was completed by the plastic surgery service- Dr. Kun Manuel    Significant Surgical Tasks Conducted by the Assistant(s), if Applicable: The skilled assistance of the Physician Assistant, Doreen Luciano PA-C, was necessary for the successful completion of this case. She was essential for proper positioning of the patient, manipulation of instruments, proper exposure, manipulation of tissue, and wound closure.       ESTIMATED BLOOD LOSS: 50 ml    Implants:   Implant Name Type Inv. Item Serial No.  Lot No. LRB No. Used Action   TISSUE ALLODERM SELECT 1.2-2.0 - [id number]  TISSUE ALLODERM SELECT 1.2-2.0  [employer] [id number] Left 1 Implanted   TISSUE ALLODERM SELECT 1.2-2.0 - [id number]  TISSUE ALLODERM SELECT 1.2-2.0  [employer] [id number] Right 1 Implanted   [facility] EP 450CC - [id number]  EXP[facility]ER [facility] EP 450CC [id number] [employer] [id number] Right 1 Implanted   [facility] EP 450CC - [id number]  [facility]  EP 450CC  [id number] General Blood [id number] Right 1 Implanted       Specimens:   Specimen (24h ago, onward)       Start     Ordered    10/14/24 1212  Specimen to Pathology  RELEASE UPON ORDERING        References:    Click here for ordering Quick Tip   Question:  Release to patient  Answer:  Immediate    10/14/24 1212                   ID Type Source Tests Collected by Time Destination   A :  Tissue Breast, Right SPECIMEN TO PATHOLOGY Mercy Ya MD 10/14/2024 1212    B :  Tissue Breast, Left SPECIMEN TO PATHOLOGY Mercy Ya MD 10/14/2024 1212    C : R axillary sentinel lymph node #1- Hot Blue 243 Tissue Lymph Node SPECIMEN TO PATHOLOGY Mercy Ya MD 10/14/2024 1224                 Condition: Good      Attestation: I was present and scrubbed for the entire procedure.    Korin Palacios MD

## 2024-10-14 NOTE — OP NOTE
DATE OF PROCEDURE: 10/14/2024    SURGEON: Mercy Ya M.D.    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 Right breast simple mastectomy  2. Identification of right axillary sentinel lymph node  3. Right axillary deep sentinel lymph node biopsy   4. Injection of right breast with Radiotracer-technetium-labeled radiocolloid and Isosulfan blue dye intra-operatively  for sentinel lymph node identification pre-operatively  5. Injection of left breast with Superparamagnetic iron oxide (MagTrace) intra-operatively for possible sentinel lymph node identification in the future.    PROCEDURE IN DETAIL:   Kaelyn Espinoza is a 59 y.o. female brought to the operating room for definitive surgical management of right breast cancer. The patient has elected to undergo bilateral simple mastectomy with right sentinel lymph node biopsy for aleena assessment. The patient was informed of the possible risks and complications of the procedure, including but not limited to anesthetic risks, bleeding, infection, and need for additional surgery. The patient concurred with the proposed plan, and has given informed consent. The site of surgery was properly noted/marked in the preoperative holding area.    The patient's right breast was injected with Radiotracer and Isosulfan blue dye  intra-operatively  to facilitate sentinel lymph node identification. The patient's left breast was injected with Superparamagnetic iron oxide (MagTrace) intra-opertively to facilitate sentinel lymph node identification. The patient was then  brought to the operating room and placed in the supine position with both upper extremities extended.  General anesthesia was administered. Perioperative antibiotics were administered and a time out was performed confirming the patient, site, and procedure. The bilateral chest and axilla was then prepped and draped in the usual sterile fashion.    The Right Mastectomy  The right  skin-sparing  mastectomy was performed first. A large elliptical skin incision was made to right breast tissue that included the nipple-areolar complex. Flaps were raised in the avascular plane between subcutaneous tissue and breast tissue was used for to raise flaps from the clavicle superiorly, the sternum medially, the anterior rectus sheath inferiorly, and the lateral border of the pectoralis major muscle laterally. Hemostasis was maintained in the flaps. Next, the breast tissue and underlying pectoralis fascia were excised from the pectoralis major muscle, progressing from medial to lateral. At the lateral border of the pectoralis major muscle, the breast tissue was sung laterally and a lateral pedicle identified where breast tissue gave way to fat of axilla. The lateral pedicle was incised, removed and the specimen was marked. Superiorly was a short suture and laterally was a long suture. The resulting mastectomy specimen was marked using a short stitch superiorly and a long stitch laterally. The breast was sent to pathology for permanent evaluation. The operative field was irrigated with normal saline and all bleeding points were secured with Bovie electrocautery.     Right [place] Lymph Node Biopsy was then performed  Using the hand-held gamma probe ([employer]) , activity was noted and localized in the right axilla. A small transverse inferior axillary incision over the area of activity. Dissection was carried down through the clavipectoral fascia using electrocautery. The lateral border of the pectoralis of the right axilla was  excised and the axilla was entered. Using a hand-held gamma probe (TruNode)  the axilla was assessed for a sentinel lymph node. A Blue afferent lymphatic channel coming from the breast upper outer quadrant to level 1 axillary lymph tissue. [place] lymph nodes were identified as follows:  Right axillary sentinel lymph node #1, Radiotracer count: 243 and Isosulfan dye identified      The substances used for sentinel lymph node biopsy in this patient: Radiotracer and Isosulfan blue dye  Number of lymph nodes removed with MagTrace: 0  Number of lymph nodes removed with Isosulfan (or Methylene) blue dye and Radiotracer: 1  Number of lymph nodes removed with only Radiotracer signal: 0  Number of lymph nodes removed with only Isosulfan (or Methylene) blue dye: 0  Number of lymph nodes removed with only palpable: 0    The sentinel lymph nodes were not sent to Pathologist for intra-operative Frozen Section and permanent pathology review will be performed.    No further dissection was undertaken.    Drain placement and incision closer was completed by the plastic surgery service.     The Left Mastectomy - Dictated by Dr. Korin Palacios      Significant Surgical Tasks Conducted by the Assistant(s), if Applicable: Due to the lack of a qualified senior resident or fellow to assist  in the surgical care of this patient, the skilled assistance of the Physician Assistant, Doreen Luciano PA-C, were required in order to safely and expediently carry out this surgery. She was essential for proper positioning of the patient, manipulation of instruments, proper exposure, manipulation of tissue, and wound closure.         ESTIMATED BLOOD LOSS: 25 ml    Implants:   Implant Name Type Inv. Item Serial No.  Lot No. LRB No. Used Action   TISSUE ALLODERM SELECT 1.2-2.0 - [id number]  TISSUE ALLODERM SELECT 1.2-2.0  [employer] [facility] [id number] Left 1 Implanted   TISSUE ALLODERM SELECT 1.2-2.0 - [id number]  TISSUE ALLODERM SELECT  1.2-2.0  [employer] [id number] Right 1 Implanted   EXPANDER HALINAE  EP 450CC - [id number]  EXPANDER NATGALENE FH EP 450CC [id number] [employer] [id number] Right 1 Implanted   EXPANDER HALINAE  EP 450CC - [id number]  EXPANDER ELMER MOHAN EP 450CC [id number] [employer] [id number] Right 1 Implanted       Specimens:   Specimen (24h ago, onward)       Start     Ordered    10/14/24 1212  Specimen to Pathology  RELEASE UPON ORDERING        References:    Click here for ordering Quick Tip   Question:  Release to patient  Answer:  Immediate    10/14/24 1212                   ID Type Source Tests Collected by Time Destination   A :  Tissue Breast, Right SPECIMEN TO PATHOLOGY Mercy Ya MD 10/14/2024 1212    B :  Tissue Breast, Left SPECIMEN TO PATHOLOGY Mercy Ya MD 10/14/2024 1212    C : R axillary sentinel lymph node #1- Hot Blue 243 Tissue Lymph Node SPECIMEN TO PATHOLOGY Mercy Ya MD 10/14/2024 1224                 Condition: Good    Disposition: PACU - hemodynamically stable.    Attestation: I performed the procedure.    [place] Node Biopsy for Breast Cancer  Operation performed with curative intent Yes   Tracer(s) used to identify sentinel nodes in the upfront surgery (non-neoadjuvant) setting Dye and Radioactive tracer   Tracer(s) used to identify sentinel nodes in the neoadjuvant setting N/A   All nodes (colored or non-colored) present at the end of a dye-filled lymphatic channel were removed Yes   All significantly radioactive nodes were removed Yes   All palpably suspicious nodes were removed Yes   Biopsy-proven positive nodes marked with clips prior to chemotherapy were identified and removed N/A

## 2024-10-14 NOTE — PLAN OF CARE
MAG TRACE INJECTED INTO LEFT BREAST @ 1047 BY DR. LOPEZ    NUCLEAR MEDICINE INJECTED INTO RIGHT BREAST @ 1047 BY DR. LOPEZ

## 2024-10-14 NOTE — ANESTHESIA PREPROCEDURE EVALUATION
10/13/2024  Kaelyn Espinoza is a 59 y.o., female, who presents for the following:    Procedures:      MASTECTOMY, SIMPLE // Bilateral (Bilateral: Breast)      BIOPSY, LYMPH NODE, SENTINEL // Right // Need Nuc Med Need Blue Dye Need True Node Need MagTrace to inject the left breast intra-operatively (Right: Chest) - Need Nuc Med  Need Blue Dye  Need True Node  Need MagTrace to inject the left breast intra-operatively      LYMPHADENECTOMY, AXILLARY / Right (Right: Axilla)      INSERTION, TISSUE EXPANDER, BREAST / Bilateral (Bilateral: Breast)      INSERTION, BIOLOGIC IMPLANT, FOR SOFT TISSUE REINFORCEMENT // allodermAnesthesia type: GeneralDiagnosis: Paget's carcinoma of the nipple, 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     I have reviewed the Nursing Notes. I have reviewed the NPO Status.   I have reviewed the Medications.     Review of Systems  Anesthesia Hx:  No problems with previous Anesthesia             Denies Family Hx of Anesthesia complications.    Denies Personal Hx of Anesthesia complications.                    Hematology/Oncology:                                  Oncology Comments: PAGET'S DISEASE OF THE NIPPLE.     Cardiovascular:  Cardiovascular Normal                                            Pulmonary:  Pulmonary Normal                       Endocrine:  Endocrine Normal                Physical Exam  General: Alert and Oriented    Airway:  Mallampati: III   Mouth Opening: Small, but > 3cm  TM Distance: 4 - 6 cm  Tongue: Normal  Neck ROM: Normal ROM    Dental:  Intact    Chest/Lungs:  Normal Respiratory Rate    Heart:  Rate: Normal  Rhythm:  Regular Rhythm        Anesthesia Plan  Type of Anesthesia, risks & benefits discussed:    Anesthesia Type: Gen ETT  Intra-op Monitoring Plan: Standard ASA Monitors  Post Op Pain Control Plan: multimodal analgesia and IV/PO Opioids PRN  Induction:  IV  Airway Plan: Direct, Post-Induction  Informed Consent: Informed consent signed with the Patient and all parties understand the risks and agree with anesthesia plan.  All questions answered. Patient consented to blood products? No  ASA Score: 1  Day of Surgery Review of History & Physical: H&P Update referred to the surgeon/provider.  Anesthesia Plan Notes: Premedication: Midazolam  Special Technique: Preemptive analgesia with Neurontin, zofran, acetaminophen   PONV prophylaxis with intraop zofran, decadron     Ready For Surgery From Anesthesia Perspective.     .

## 2024-10-14 NOTE — DISCHARGE INSTRUCTIONS
..Patient Education       Mastectomy Discharge Instructions     What care is needed at home?  Take your medication as ordered by your doctor.  Keep your dressing and incision clean, intact, and dry. Do not remove dressing until doctor say otherwise.  Do not soak in a bathtub, hot tub, or swimming pool until your surgery cuts are fully healed or told otherwise by the doctor.  Walk around the house often when you get home. Try to walk a little more each day.  Your doctor may have you wear special stockings. These will help to prevent blood clots.  Keep your breast area clean and dry.  If you have drainage tubes, empty the bulb when it gets full. You may have to measure the amount of fluid and write the amount on a paper or diary. The amount of drainage should go down or become a little less each day.   Talk to your doctor about when you can wear a bra. You can meet with the staff about the best place to try a new bra. They can tell you about getting a weight or breast mold also called a prosthetic breast to wear in your bra cup.  No blood pressure or lab draws on the left side/arm.    Do not lift anything over 10 pounds (4.5 kg).   Ask your doctor when you may go back to your normal activities like work, driving, or sex.  What follow-up care is needed?   Be sure to keep your follow up visit.  If you had lymph nodes removed, you may need special care to reduce swelling in your arm.  Will physical activity be limited?   You may have to limit your activity for a while. Talk to your doctor about the right amount of activity for you.  What problems could happen?   Infection  Fluid buildup in your arm or hand if lymph nodes were removed  You may feel sad or depressed about the change of your body  When do I need to call the doctor?   Signs of infection. These include a fever of 100.4°F (38°C) or higher, chills, wound that will not heal.  Signs of wound infection. These include swelling, redness, warmth around the wound; too  much pain when touched; yellowish, greenish, or bloody discharge; foul smell coming from the cut site; cut site opens up.  Stitches that hold the drain to your skin are coming loose or are missing.  Cough, shortness of breath, chest pain  Upset stomach and throwing up that are not helped by nausea drugs  Redness, warmth, swelling, stiffness, or hardness in the arm or hand on the side where the lymph nodes were taken out  Lumps or skin changes in tissue left on mastectomy side  Low mood  Heavy bleeding coming from the cut sites or the area where the drains come through your skin.   Patient Education     Lele-Palacios Drain      What will the results be?   This drain gets rid of extra fluid from your cut site. This will help it to get better faster.  What happens after the procedure?   You may be sore where the drain was put in. Your doctor will give you drugs for the pain.  You may need to stay in the hospital until you are well enough to go home. Your doctor will watch to see how much fluid is in the bulb each day.  The nurses will empty the drain when it gets about half full or at certain times during the day and measure the amount of fluid emptied.  What care is needed at home?   Wash your hands with soap and water every time before and after you empty the drain. Do not change your dressing around the FLACO drain.  Do not put any lotions, creams, or oils around the FLACO drain site.  Empty the drain. Remove the stopper at the end. Pour out the drainage. Your doctor may want you to measure how much drainage is in the bulb. Then, squeeze the bulb to get rid of air and put the stopper back in place.  Care for the tube if there is a clot in it. Squeeze the tube over the clot. You can also squeeze the tube from your skin to the bulb and then let it go. This should open the tube.   Measure the amount of fluid in the drain ball after you empty it each day up to 3 times a day and write it down on a chart.  Look for signs of  infection. Your doctor will want to know if you have a fever of 100.4°F (38°C) or higher, chills, if you get very red and sore around the drain, or if the fluid in the drain turns cloudy or smells. Your doctor will want to know if the skin around the drain has any fluid or pus coming out of it.  Sleep on the side opposite to the FLACO drain. This prevents any kinking of the tubing or pulling out the suction bulb.  Avoid bumping the drain.  You can hold the drain up with a safety pin while you are moving around.   Ask your doctor about your activity level while you have your drain in place.  What follow-up care is needed?   Be sure to keep your follow up visit.  Your doctor will tell you when the drain may be removed.  What problems could happen?   Fluid leaking from the cut site  Bleeding  Infection  Clot in the tube  Tube and drain falls out  Cut area opens up  Drain stops working  When do I need to call the doctor?   Signs of infection at the drain site. These include swelling, redness, warmth around the wound, too much pain when touched, yellowish or greenish or bloody discharge, foul smell coming from the cut site.  Drain becomes loose, comes apart, abruptly stops draining, or falls out

## 2024-10-14 NOTE — ANESTHESIA POSTPROCEDURE EVALUATION
Anesthesia Post Evaluation    Patient: Kaelyn Espinoza    Procedure(s) Performed: Procedure(s) (LRB):  MASTECTOMY, SIMPLE // Bilateral (Bilateral)  BIOPSY, LYMPH NODE, SENTINEL // Right // Need Nuc Med Need Blue Dye Need True Node Need MagTrace to inject the left breast intra-operatively (Right)  LYMPHADENECTOMY, AXILLARY / Right (Right)  INSERTION, TISSUE EXPANDER, BREAST / Bilateral (Bilateral)  INSERTION, BIOLOGIC IMPLANT, FOR SOFT TISSUE REINFORCEMENT // alloderm (N/A)    Final Anesthesia Type: general      Patient location during evaluation: PACU  Patient participation: Yes- Able to Participate  Level of consciousness: awake and alert  Post-procedure vital signs: reviewed and stable  Pain management: adequate  Airway patency: patent    PONV status at discharge: No PONV  Anesthetic complications: no      Cardiovascular status: hemodynamically stable  Respiratory status: unassisted  Hydration status: euvolemic  Follow-up not needed.              Vitals Value Taken Time   /63 10/14/24 1511   Temp 36.6 °C (97.9 °F) 10/14/24 1350   Pulse 78 10/14/24 1512   Resp 14 10/14/24 1511   SpO2 97 % 10/14/24 1512   Vitals shown include unfiled device data.      Event Time   Out of Recovery 15:13:06         Pain/Lizet Score: Pain Rating Prior to Med Admin: 6 (10/14/2024  2:38 PM)  Lizet Score: 8 (10/14/2024  3:00 PM)

## 2024-10-15 PROCEDURE — 25000003 PHARM REV CODE 250: Performed by: SURGERY

## 2024-10-15 PROCEDURE — 63600175 PHARM REV CODE 636 W HCPCS: Mod: JZ,JG | Performed by: SURGERY

## 2024-10-15 RX ADMIN — DOCUSATE SODIUM 100 MG: 100 CAPSULE, LIQUID FILLED ORAL at 08:10

## 2024-10-15 RX ADMIN — CLINDAMYCIN PHOSPHATE 900 MG: 900 INJECTION, SOLUTION INTRAVENOUS at 01:10

## 2024-10-15 NOTE — DISCHARGE SUMMARY
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 Right breast simple mastectomy  2. Identification of right axillary sentinel lymph node  3. Right axillary deep sentinel lymph node biopsy   4. Injection of right breast with Radiotracer-technetium-labeled radiocolloid and Isosulfan blue dye intra-operatively  for sentinel lymph node identification pre-operatively  5. Injection of left breast with Superparamagnetic iron oxide (MagTrace) intra-operatively for possible sentinel lymph node identification in the future.    PROCEDURE PERFORMED:    Bilateral placement of subpectoral tissue expanders.    Placement of bilateral acellular dermal matrix for breast reconstruction.  Did well   DC home per instructions

## 2024-10-15 NOTE — PROGRESS NOTES
[facility] Surgical - Med Surg  Surgical Oncology  Progress Note    Subjective:     Interval History:  Doing well this morning. Complaining of some pain over the left drain site. JESSA ORTA.     Post-Op Info:  Procedure(s) (LRB):  MASTECTOMY, SIMPLE // Bilateral (Bilateral)  BIOPSY, LYMPH NODE, SENTINEL // Right // Need Nuc Med Need Blue Dye Need True Node Need MagTrace to inject the left breast intra-operatively (Right)  LYMPHADENECTOMY, AXILLARY / Right (Right)  INSERTION, TISSUE EXPANDER, BREAST / Bilateral (Bilateral)  INSERTION, BIOLOGIC IMPLANT, FOR SOFT TISSUE REINFORCEMENT // alloderm (N/A)   1 Day Post-Op       Assessment/Plan:     -Doing well s/p bilateral mastectomy, r slnb and tissue expander insertion.   - tolerating diet    Brett Yee M.D.   [facility] General Surgery - PGY1

## 2024-10-16 VITALS
TEMPERATURE: 99 F | HEART RATE: 66 BPM | HEIGHT: 63 IN | SYSTOLIC BLOOD PRESSURE: 92 MMHG | DIASTOLIC BLOOD PRESSURE: 51 MMHG | WEIGHT: 123.69 LBS | BODY MASS INDEX: 21.91 KG/M2 | RESPIRATION RATE: 20 BRPM | OXYGEN SATURATION: 98 %

## 2024-10-17 LAB — PSYCHE PATHOLOGY RESULT: NORMAL

## 2024-10-20 NOTE — ASSESSMENT & PLAN NOTE
Recommended Clinical Follow-up in 3 months  Drains per Plastic Surgery -removed the left drain today.

## 2024-10-21 ENCOUNTER — TELEPHONE (OUTPATIENT)
Dept: SURGERY | Facility: CLINIC | Age: 59
End: 2024-10-21
Payer: COMMERCIAL

## 2024-10-21 NOTE — TELEPHONE ENCOUNTER
Patient called with her pathology results.        ----- Message from Mercy Ya MD sent at 10/20/2024  4:57 PM CDT -----  Please call the patient and inform pathology results revealed the cancer was completely removed and all lymph nodes were negative for cancer. Further discussion will be had at their post-op/follow-up appointment.

## 2024-10-22 ENCOUNTER — OFFICE VISIT (OUTPATIENT)
Dept: SURGERY | Facility: CLINIC | Age: 59
End: 2024-10-22
Payer: COMMERCIAL

## 2024-10-22 VITALS
BODY MASS INDEX: 21.83 KG/M2 | WEIGHT: 123.19 LBS | TEMPERATURE: 99 F | DIASTOLIC BLOOD PRESSURE: 61 MMHG | HEIGHT: 63 IN | SYSTOLIC BLOOD PRESSURE: 92 MMHG | HEART RATE: 78 BPM

## 2024-10-22 DIAGNOSIS — C50.011 PAGET'S CARCINOMA OF THE NIPPLE, RIGHT: Primary | ICD-10-CM

## 2024-10-22 PROCEDURE — 1159F MED LIST DOCD IN RCRD: CPT | Mod: CPTII,S$GLB,, | Performed by: SURGERY

## 2024-10-22 PROCEDURE — 1160F RVW MEDS BY RX/DR IN RCRD: CPT | Mod: CPTII,S$GLB,, | Performed by: SURGERY

## 2024-10-22 PROCEDURE — 99999 PR PBB SHADOW E&M-EST. PATIENT-LVL III: CPT | Mod: PBBFAC,,, | Performed by: SURGERY

## 2024-10-22 PROCEDURE — 99024 POSTOP FOLLOW-UP VISIT: CPT | Mod: S$GLB,,, | Performed by: SURGERY

## 2024-10-22 PROCEDURE — 3078F DIAST BP <80 MM HG: CPT | Mod: CPTII,S$GLB,, | Performed by: SURGERY

## 2024-10-22 PROCEDURE — 3074F SYST BP LT 130 MM HG: CPT | Mod: CPTII,S$GLB,, | Performed by: SURGERY

## 2024-10-22 RX ORDER — SULFAMETHOXAZOLE AND TRIMETHOPRIM 800; 160 MG/1; MG/1
1 TABLET ORAL 2 TIMES DAILY
COMMUNITY
Start: 2024-10-10

## 2024-10-22 RX ORDER — OXYCODONE AND ACETAMINOPHEN 7.5; 325 MG/1; MG/1
1 TABLET ORAL
COMMUNITY
Start: 2024-10-10

## 2024-10-22 RX ORDER — CYCLOBENZAPRINE HCL 10 MG
10 TABLET ORAL
COMMUNITY
Start: 2024-10-10

## 2024-10-22 RX ORDER — MUPIROCIN 20 MG/G
OINTMENT TOPICAL DAILY
COMMUNITY
Start: 2024-10-10

## 2024-10-22 NOTE — PROGRESS NOTES
"Drill was then admitted in the sella dorsalis she no [facility] [place] [facility]  Breast Surgical Oncology  New Patient Office Visit - H&P      Referring Provider: No ref. provider found   PCP: No, Primary Doctor     Patient Care Team:  No, Primary Doctor as PCP - Bekah Higgins, RN as Registered Nurse  Kun Manuel MD as Consulting Physician (Plastic Surgery)      Chief Complaint:   Chief Complaint   Patient presents with    Post-op Evaluation     Bilateral mastectomy - Sx 10/14/24 - Patient is doing well. Stabbing pain on her left side near the drain. Denies any swelling or redness. Patient completed her antibiotics yesterday. (10/21/24)         Subjective:   Treatment History:  10/14/2024 - she is status post bilateral skin-sparing mastectomy with Right SLNB and tissue expander reconstruction    Interval History:  10/22/2024 - She is doing well for the most part. Complains of left sided pain just near the FLACO drain insertion site. It "takes her breath away". Drains are are otherwise slowing down in output.     HPI:  Kaelyn Espinoza is a 59 y.o. female who presents on 10/22/2024 for evaluation of newly diagnosed right  breast cancer.    She presented on 9/3/2024 for evaluation of right nipple inversion and erythema around nipple areola complex that started about 1 year ago. Patient had not undergone mammogram in many years. A punch biopsy was done at that time on the suspicious site which was found to be Paget's Disease.    A detailed patient history was obtained and reviewed. She currently denies any breast issues including rashes, redness, pain, swelling, nipple discharge, or new lumps/masses.    MG breast density:     Imaging:    MRI Breast w/wo Contrast, w/CAD, Bilateral  9/18/2024  FINDINGS: There is mild background parenchymal enhancement. The breasts have heterogeneous fibroglandular tissue.   1) RIGHT BREAST:  The right nipple-areolar complex is thickened and " demonstrates diffuse enhancement with flattening of the right nipple. In the upper-outer quadrant of the right breast, middle to posterior depths, there is heterogeneous, regional non mass enhancement which spans 45 mm AP x 20 mm transverse x 46 mm craniocaudal.  In the 11:00 subareolar right breast, anterior depth, there is a 10 x 7 x 8 mm oval, nonenhancing mass with circumscribed margins which corresponds to the sonographic finding in this region.  This mass is T1 bright and likely reflects blood products or proteinaceous fluid. No axillary or internal mammary lymphadenopathy is identified.    2) LEFT BREAST:  No suspicious areas of enhancement or areas of architectural distortion are seen.  There is no skin thickening or nipple retraction.  No axillary or internal mammary lymphadenopathy is identified.   IMPRESSION: SUSPICIOUS OF MALIGNANCY 1. Thickening and diffuse enhancement of the right nipple-areolar complex with flattening of the right nipple corresponds to the biopsy-proven Paget's disease of the nipple.  2. Heterogeneous, regional non mass enhancement in the upper-outer quadrant of the right breast, middle to posterior depths, spanning up to 46 mm is suspicious.  3. Oval, nonenhancing T1 bright mass with circumscribed margins in the 11:00 subareolar right breast is suspicious.   RECOMMENDATIONS:   If breast conservation is being considered, recommend a right breast tomosynthesis/stereotactic guided core needle biopsy of the focal asymmetry in the 10:00 right breast, middle depth, which partially correlates with the non mass enhancement seen in this region as well as a right breast ultrasound-guided core needle biopsy of the mass in the 11:00 subareolar right breast. MRI BI-RADS: 4 Suspicious    BL DG MG w/ FRANCO US Breast Left Limited, US Breast Right Complete  8/29/2024  1) BREAST COMPOSITION: The breasts are heterogeneously dense, which may obscure small masses.  No suspicious masses, calcifications, or  other signs of malignancy are identified.  The suspected asymmetries in the superior breasts bilaterally, middle depths, efface into normal fibroglandular tissue on the spot compression tomosynthesis images and are thus considered benign.    2) ULTRASOUND FINDINGS: High frequency real-time ultrasound evaluation was performed for further evaluation. Complete ultrasound evaluation of the right breast, including all 4 quadrants, subareolar regions, and axilla and targeted ultrasound evaluation of the superior and subareolar left breast and left axilla was performed.  In the 6:00 periareolar right breast there is an 8 x 2 x 3 mm oval, hypoechoic intraductal mass versus debris. In the 11:00 subareolar right breast there is an 11 x 3 x 8 mm oval, hypoechoic intraductal mass versus debris.  In the 6:00 subareolar left breast there is a 10 x 5 x 5 mm oval, hypoechoic intraductal mass/debris.  Focal shadowing is noted in the 8:00 right breast, 4 cm from the nipple, 9:00 right breast, 6 cm from the nipple, and 12:00 right breast, 2 cm from the nipple, though no discrete mass is seen throughout the right breast except for the aforementioned masses.  Benign lymph nodes are noted in the bilateral axillae.  3) IMPRESSION: INCOMPLETE: NEEDS ADDITIONAL IMAGING EVALUATION         Pathology:  Right Nippe Punch Bippsy  9/03/2024  PAGET'S DISEASE OF THE NIPPLE.   Receptor status pending  ER 0%  IL 0%    Bilateral Skin-Sparing Mastectomy  10/14/2024  BREAST, RIGHT, MASTECTOMY:   DUCTAL CARCINOMA IN SITU WITH ASSOCIATED PAGET'S DISEASE,   BREAST, LEFT, MASTECTOMY:   BENIGN BREAST TISSUE WITH FIBROADENOMATOID CHANGE, FIBROCYSTIC CHANGES AND CALCIFICATIONS WITHIN LOBULES.   LYMPH NODE, RIGHT AXILLARY SENTINEL #1, EXCISION:   ONE LYMPH NODE, NEGATIVE FOR METASTATIC CARCINOMA.     OB/GYN History:  Age at Menarche Onset: 12  Menopausal Status: postmenopausal, LMP: No LMP recorded (lmp unknown). Patient is  postmenopausal.  Hysterectomy/Oophorectomy: menopause, at age 39  Hormonal birth control (duration): No none.   Pregnancy History:   Age at first live birth: 17  Hormone Replacement Therapy: No, none  Patient denies nipple discharge. Reports nipple inversion and skin changes.   Patient denies to previous breast biopsy.   Patient denies to a personal history of breast cancer.          Other Relevant History:  Prior thoracic RT: none  Genetic testing: none  Ashkenazi Alevism descent: No    Family History:  Family History   Problem Relation Name Age of Onset    Hypertension Mother      Heart disease Father      Breast cancer Niece  30 - 40        Past History:  Past Medical History:   Diagnosis Date    Breast cancer     right        Past Surgical History:   Procedure Laterality Date    AXILLARY NODE DISSECTION Right 10/14/2024    Procedure: LYMPHADENECTOMY, AXILLARY / Right;  Surgeon: Mercy Ya MD;  Location: [facility];  Service: General;  Laterality: Right;    BREAST BIOPSY      BUNIONECTOMY Bilateral      SECTION      HERNIA REPAIR      INSERTION OF BREAST TISSUE EXPANDER Bilateral 10/14/2024    Procedure: INSERTION, TISSUE EXPANDER, BREAST / Bilateral;  Surgeon: Kun Manuel MD;  Location: [facility] OR;  Service: Plastics;  Laterality: Bilateral;    INSERTION, BIOLOGIC IMPLANT, FOR SOFT TISSUE REINFORCEMENT N/A 10/14/2024    Procedure: INSERTION, BIOLOGIC IMPLANT, FOR SOFT TISSUE REINFORCEMENT // alloderm;  Surgeon: Kun Manuel MD;  Location: [facility] OR;  Service: Plastics;  Laterality: N/A;    KNEE SURGERY      SENTINEL LYMPH NODE BIOPSY Right 10/14/2024    Procedure: BIOPSY, LYMPH NODE, SENTINEL // Right // Need Nuc Med Need Blue Dye Need True Node Need MagTrace to inject the left breast intra-operatively;  Surgeon: Mercy Ya MD;  Location: [facility];  Service: General;  Laterality: Right;  Need Nuc Med  Need Blue Dye  Need True Node  Need MagTrace to inject the left breast  "intra-operatively    SIMPLE MASTECTOMY Bilateral 10/14/2024    Procedure: MASTECTOMY, SIMPLE // Bilateral;  Surgeon: Mercy Ya MD;  Location: [facility];  Service: General;  Laterality: Bilateral;    TONSILLECTOMY          Social History     Socioeconomic History    Marital status: Single   Tobacco Use    Smoking status: Never    Smokeless tobacco: Never   Substance and Sexual Activity    Alcohol use: Not Currently     Comment: socially    Drug use: Never    Sexual activity: Not Currently        Body mass index is 21.82 kg/m².     Allergy/Medications:   Review of patient's allergies indicates:  No Known Allergies       Current Outpatient Medications:     cyclobenzaprine (FLEXERIL) 10 MG tablet, Take 10 mg by mouth., Disp: , Rfl:     mupirocin (BACTROBAN) 2 % ointment, Apply topically once daily., Disp: , Rfl:     oxyCODONE-acetaminophen (PERCOCET) 7.5-325 mg per tablet, Take 1 tablet by mouth every 6 to 8 hours as needed for Pain., Disp: , Rfl:     sulfamethoxazole-trimethoprim 800-160mg (BACTRIM DS) 800-160 mg Tab, Take 1 tablet by mouth 2 (two) times daily. (Patient not taking: Reported on 10/22/2024), Disp: , Rfl:          Review of Systems:  Review of Systems   All other systems reviewed and are negative.  Except mentioned above       Objective:     Vitals:  Blood pressure 92/61, pulse 78, temperature 98.8 °F (37.1 °C), temperature source Oral, height 5' 3" (1.6 m), weight 55.9 kg (123 lb 3.2 oz).      Physical Exam:  General: The patient is awake, alert and oriented times three. The patient is well nourished and in no acute distress.   Musculoskeletal: The patient has a normal range of motion of her bilateral upper extremities.   Breast:  Physical Exam  Chest:   Breasts:     Right: Absent.      Left: Absent.          Comments: Status post bilateral skin-sparing mastectomy. Mild superficial skin necrosis at the incisions on both sides.  Drains in place with serosanguineous output.  Left Drain black dot " showing.       Neurologic: cranial nerves intact, no signs of peripheral neurological deficit, motor/sensory function intact        Assessment and Discussion:      Cancer Staging   Paget's carcinoma of the nipple, right  Staging form: Breast, AJCC 8th Edition  - Clinical stage from 9/26/2024: Stage 0 (cTis (Paget), cN0, cM0, ER-, RI-, HER2: Not Assessed) - Signed by Mercy Ya MD on 10/20/2024  - Pathologic stage from 10/17/2024: Stage 0 (pTis (DCIS), pN0(sn), cM0, ER-, RI-, HER2: Not Assessed) - Signed by Mercy Ya MD on 10/20/2024      Encounter Diagnoses   Name Primary?    Paget's carcinoma of the nipple, right Yes      We discussed her pathology results in detail.    No invasive carcinoma identified.     Next steps and follow-up were also discussed.     Will remove left drain today, given the black dot is showing and it is almost out. (Spoke with Dr. Manuel)     Plan:     Problem List Items Addressed This Visit          Oncology    Paget's carcinoma of the nipple, right - Primary    Current Assessment & Plan     Recommended Clinical Follow-up in 3 months  Drains per Plastic Surgery -removed the left drain today.                All of questions were answered    Mercy Ya MD  Breast Surgical Oncology     OFFICE VISIT CODING:  Post-Op Visit

## 2025-02-11 ENCOUNTER — OFFICE VISIT (OUTPATIENT)
Dept: SURGERY | Facility: CLINIC | Age: 60
End: 2025-02-11
Payer: COMMERCIAL

## 2025-02-11 VITALS
OXYGEN SATURATION: 99 % | BODY MASS INDEX: 22.54 KG/M2 | WEIGHT: 127.19 LBS | SYSTOLIC BLOOD PRESSURE: 104 MMHG | TEMPERATURE: 98 F | RESPIRATION RATE: 18 BRPM | DIASTOLIC BLOOD PRESSURE: 66 MMHG | HEART RATE: 65 BPM | HEIGHT: 63 IN

## 2025-02-11 DIAGNOSIS — Z90.13 HISTORY OF BILATERAL MASTECTOMY: ICD-10-CM

## 2025-02-11 DIAGNOSIS — C50.011 PAGET'S CARCINOMA OF THE NIPPLE, RIGHT: Primary | ICD-10-CM

## 2025-02-11 PROCEDURE — 3008F BODY MASS INDEX DOCD: CPT | Mod: CPTII,S$GLB,, | Performed by: PHYSICIAN ASSISTANT

## 2025-02-11 PROCEDURE — 99999 PR PBB SHADOW E&M-EST. PATIENT-LVL IV: CPT | Mod: PBBFAC,,, | Performed by: PHYSICIAN ASSISTANT

## 2025-02-11 PROCEDURE — 3078F DIAST BP <80 MM HG: CPT | Mod: CPTII,S$GLB,, | Performed by: PHYSICIAN ASSISTANT

## 2025-02-11 PROCEDURE — 99214 OFFICE O/P EST MOD 30 MIN: CPT | Mod: S$GLB,,, | Performed by: PHYSICIAN ASSISTANT

## 2025-02-11 PROCEDURE — 1159F MED LIST DOCD IN RCRD: CPT | Mod: CPTII,S$GLB,, | Performed by: PHYSICIAN ASSISTANT

## 2025-02-11 PROCEDURE — 3074F SYST BP LT 130 MM HG: CPT | Mod: CPTII,S$GLB,, | Performed by: PHYSICIAN ASSISTANT

## 2025-02-11 PROCEDURE — 1160F RVW MEDS BY RX/DR IN RCRD: CPT | Mod: CPTII,S$GLB,, | Performed by: PHYSICIAN ASSISTANT

## 2025-02-11 NOTE — PROGRESS NOTES
[facility] [place] [facility]  Breast Surgical Oncology  New Patient Office Visit - H&P      Referring Provider: No ref. provider found   PCP: No, Primary Doctor     Patient Care Team:  No, Primary Doctor as PCP - Kun Galaviz MD as Consulting Physician (Plastic Surgery)      Chief Complaint:   Chief Complaint   Patient presents with    Follow-up     Patient denies any breast related concerns today         Subjective:   Treatment History:  10/14/2024 - she is status post bilateral skin-sparing mastectomy with Right SLNB and tissue expander reconstruction    Interval History:  10/22/2024 - Kaelyn Espinoza is here today for f/u for hx of breast cancer. She is doing well. Has no concerns today. She underwent implant reconstruction in the interval which went well.    HPI:  Kaelyn Espinoza is a 59 y.o. female who presents on 2/11/2025 for evaluation of newly diagnosed right  breast cancer.    She presented on 9/3/2024 for evaluation of right nipple inversion and erythema around nipple areola complex that started about 1 year ago. Patient had not undergone mammogram in many years. A punch biopsy was done at that time on the suspicious site which was found to be Paget's Disease.    A detailed patient history was obtained and reviewed. She currently denies any breast issues including rashes, redness, pain, swelling, nipple discharge, or new lumps/masses.    MG breast density:     Imaging:    MRI Breast w/wo Contrast, w/CAD, Bilateral  9/18/2024  FINDINGS: There is mild background parenchymal enhancement. The breasts have heterogeneous fibroglandular tissue.   1) RIGHT BREAST:  The right nipple-areolar complex is thickened and demonstrates diffuse enhancement with flattening of the right nipple. In the upper-outer quadrant of the right breast, middle to posterior depths, there is heterogeneous, regional non mass enhancement which spans 45 mm AP x 20 mm transverse x 46 mm craniocaudal.  In  the 11:00 subareolar right breast, anterior depth, there is a 10 x 7 x 8 mm oval, nonenhancing mass with circumscribed margins which corresponds to the sonographic finding in this region.  This mass is T1 bright and likely reflects blood products or proteinaceous fluid. No axillary or internal mammary lymphadenopathy is identified.    2) LEFT BREAST:  No suspicious areas of enhancement or areas of architectural distortion are seen.  There is no skin thickening or nipple retraction.  No axillary or internal mammary lymphadenopathy is identified.   IMPRESSION: SUSPICIOUS OF MALIGNANCY 1. Thickening and diffuse enhancement of the right nipple-areolar complex with flattening of the right nipple corresponds to the biopsy-proven Paget's disease of the nipple.  2. Heterogeneous, regional non mass enhancement in the upper-outer quadrant of the right breast, middle to posterior depths, spanning up to 46 mm is suspicious.  3. Oval, nonenhancing T1 bright mass with circumscribed margins in the 11:00 subareolar right breast is suspicious.   RECOMMENDATIONS:   If breast conservation is being considered, recommend a right breast tomosynthesis/stereotactic guided core needle biopsy of the focal asymmetry in the 10:00 right breast, middle depth, which partially correlates with the non mass enhancement seen in this region as well as a right breast ultrasound-guided core needle biopsy of the mass in the 11:00 subareolar right breast. MRI BI-RADS: 4 Suspicious    BL DG MG w/ FRANCO US Breast Left Limited, US Breast Right Complete  8/29/2024  1) BREAST COMPOSITION: The breasts are heterogeneously dense, which may obscure small masses.  No suspicious masses, calcifications, or other signs of malignancy are identified.  The suspected asymmetries in the superior breasts bilaterally, middle depths, efface into normal fibroglandular tissue on the spot compression tomosynthesis images and are thus considered benign.    2) ULTRASOUND FINDINGS:  High frequency real-time ultrasound evaluation was performed for further evaluation. Complete ultrasound evaluation of the right breast, including all 4 quadrants, subareolar regions, and axilla and targeted ultrasound evaluation of the superior and subareolar left breast and left axilla was performed.  In the 6:00 periareolar right breast there is an 8 x 2 x 3 mm oval, hypoechoic intraductal mass versus debris. In the 11:00 subareolar right breast there is an 11 x 3 x 8 mm oval, hypoechoic intraductal mass versus debris.  In the 6:00 subareolar left breast there is a 10 x 5 x 5 mm oval, hypoechoic intraductal mass/debris.  Focal shadowing is noted in the 8:00 right breast, 4 cm from the nipple, 9:00 right breast, 6 cm from the nipple, and 12:00 right breast, 2 cm from the nipple, though no discrete mass is seen throughout the right breast except for the aforementioned masses.  Benign lymph nodes are noted in the bilateral axillae.  3) IMPRESSION: INCOMPLETE: NEEDS ADDITIONAL IMAGING EVALUATION         Pathology:  Right Nippe Punch Bippsy  2024  PAGET'S DISEASE OF THE NIPPLE.   Receptor status pending  ER 0%  KS 0%    Bilateral Skin-Sparing Mastectomy  10/14/2024  BREAST, RIGHT, MASTECTOMY:   DUCTAL CARCINOMA IN SITU WITH ASSOCIATED PAGET'S DISEASE,   BREAST, LEFT, MASTECTOMY:   BENIGN BREAST TISSUE WITH FIBROADENOMATOID CHANGE, FIBROCYSTIC CHANGES AND CALCIFICATIONS WITHIN LOBULES.   LYMPH NODE, RIGHT AXILLARY SENTINEL #1, EXCISION:   ONE LYMPH NODE, NEGATIVE FOR METASTATIC CARCINOMA.     OB/GYN History:  Age at Menarche Onset: 12  Menopausal Status: postmenopausal, LMP: No LMP recorded (lmp unknown). Patient is postmenopausal.  Hysterectomy/Oophorectomy: menopause, at age 39  Hormonal birth control (duration): No none.   Pregnancy History:   Age at first live birth: 17  Hormone Replacement Therapy: No, none  Patient denies nipple discharge. Reports nipple inversion and skin changes.   Patient denies to  previous breast biopsy.   Patient denies to a personal history of breast cancer.          Other Relevant History:  Prior thoracic RT: none  Genetic testing: none  Ashkenazi Taoist descent: No    Family History:  Family History   Problem Relation Name Age of Onset    Hypertension Mother Dontae Espinoza     Arthritis Mother Dontae Espinoza     Miscarriages / Stillbirths Mother Dontae Espinoza     Heart disease Father Dudley Espinoza     Breast cancer Niece  30 - 40    Stroke Sister Suzi Sen         Past History:  Past Medical History:   Diagnosis Date    Breast cancer     right    Menopause 4        Past Surgical History:   Procedure Laterality Date    ABDOMINAL SURGERY      Umbilical hernia    AUGMENTATION OF BREAST      AXILLARY NODE DISSECTION Right 10/14/2024    Procedure: LYMPHADENECTOMY, AXILLARY / Right;  Surgeon: Mercy Ya MD;  Location: [facility] OR;  Service: General;  Laterality: Right;    BREAST BIOPSY      BREAST SURGERY  10 14 2024    BUNIONECTOMY Bilateral      SECTION      HERNIA REPAIR      INSERTION OF BREAST TISSUE EXPANDER Bilateral 10/14/2024    Procedure: INSERTION, TISSUE EXPANDER, BREAST / Bilateral;  Surgeon: Kun Manuel MD;  Location: [facility] OR;  Service: Plastics;  Laterality: Bilateral;    INSERTION, BIOLOGIC IMPLANT, FOR SOFT TISSUE REINFORCEMENT N/A 10/14/2024    Procedure: INSERTION, BIOLOGIC IMPLANT, FOR SOFT TISSUE REINFORCEMENT // alloderm;  Surgeon: Kun Manuel MD;  Location: [facility] OR;  Service: Plastics;  Laterality: N/A;    KNEE SURGERY      SENTINEL LYMPH NODE BIOPSY Right 10/14/2024    Procedure: BIOPSY, LYMPH NODE, SENTINEL // Right // Need Nuc Med Need Blue Dye Need True Node Need MagTrace to inject the left breast intra-operatively;  Surgeon: Mercy Ya MD;  Location: [facility] OR;  Service: General;  Laterality: Right;  Need Nuc Med  Need Blue Dye  Need True Node  Need MagTrace to inject the left breast intra-operatively    SIMPLE  "MASTECTOMY Bilateral 10/14/2024    Procedure: MASTECTOMY, SIMPLE // Bilateral;  Surgeon: Mercy Ya MD;  Location: [facility];  Service: General;  Laterality: Bilateral;    TONSILLECTOMY          Social History     Socioeconomic History    Marital status: Single   Tobacco Use    Smoking status: Former     Current packs/day: 1.00     Average packs/day: 1 pack/day for 15.0 years (15.0 ttl pk-yrs)     Types: Cigarettes    Smokeless tobacco: Never   Substance and Sexual Activity    Alcohol use: Yes     Alcohol/week: 2.0 standard drinks of alcohol     Types: 2 Drinks containing 0.5 oz of alcohol per week     Comment: socially    Drug use: Never    Sexual activity: Not Currently     Birth control/protection: Abstinence        Body mass index is 22.53 kg/m².     Allergy/Medications:   Review of patient's allergies indicates:  No Known Allergies     No current outpatient medications on file.         Review of Systems:  Review of Systems   All other systems reviewed and are negative.         Objective:     Vitals:  Blood pressure 104/66, pulse 65, temperature 98.3 °F (36.8 °C), temperature source Oral, resp. rate 18, height 5' 3" (1.6 m), weight 57.7 kg (127 lb 3.2 oz), SpO2 99%.      Physical Exam:  General: The patient is awake, alert and oriented times three. The patient is well nourished and in no acute distress.  Neck: There is no evidence of palpable cervical, supraclavicular or axillary adenopathy. The neck is supple. The thyroid is not enlarged.  Musculoskeletal: The patient has a normal range of motion of her bilateral upper extremities.  Chest: Examination of the chest wall fails to reveal any obvious abnormalities. Nonlabored breathing, symmetric expansion.  Breast:  Right:  Examination of right reconstructed breast fails to reveal any dominant masses or areas of significant focal nodularity. Implant present. Nipple surgically absent. There is no skin dimpling with movement of the pectoralis. There are no " significant skin changes overlying the breast.   Left:  Examination of the left reconstructed  breast fails to reveal any dominant masses or areas of significant focal nodularity. Implant present. Nipple surgically absent. There is no skin dimpling with movement of the pectoralis. There are no significant skin changes overlying the breast.  Abdomen: The abdomen is soft, flat, nontender and nondistended.  Integumentary: no rashes or skin lesions present  Neurologic: cranial nerves intact, no signs of peripheral neurological deficit, motor/sensory function intact          Assessment and Discussion:      Cancer Staging   Paget's carcinoma of the nipple, right  Staging form: Breast, AJCC 8th Edition  - Clinical stage from 9/26/2024: Stage 0 (cTis (Paget), cN0, cM0, ER-, TX-, HER2: Not Assessed) - Signed by Mercy Ya MD on 10/20/2024  - Pathologic stage from 10/17/2024: Stage 0 (pTis (DCIS), pN0(sn), cM0, ER-, TX-, HER2: Not Assessed) - Signed by Mercy Ya MD on 10/20/2024      Encounter Diagnoses   Name Primary?    Paget's carcinoma of the nipple, right Yes    History of bilateral mastectomy            Plan:     RTC in 6 months for CBE.    Request op note and progress notes from Dr. Manuel.    Healthy lifestyle guidelines were reviewed. She was encouraged to engage in regular exercise, maintain a healthy body weight, and avoid excessive alcohol consumption. Healthy nutritional guidelines were also discussed. Self-breast examination was reviewed with the patient in detail and she was encouraged to perform this on a monthly basis.       All of her questions were answered. She was advised to call if she develops any questions or concerns.    Doreen Luciano PA-C           Total time on the date of the visit ranged from 30-39 mins (35249). Total time includes both face-to-face and non-face-to-face time personally spent by myself on the day of the visit.    Non-face-to-face time included:  _X_  preparing to see the patient such as reviewing the patient record  __ obtaining and reviewing separately obtained history  _X_ independently interpreting results  _X_ documenting clinical information in electronic health record.    Face-to-face time included:  _X_ performing an appropriate history and examination  _X_ communicating results to the patient  _X_ counseling and educating the patient  __ ordering appropriate medications  __ ordering appropriate tests  _X_ ordering appropriate procedures (including follow-up)  _X_ answering any questions the patient had    Total Time spent on date of visit: 35 minutes

## 2025-08-12 ENCOUNTER — OFFICE VISIT (OUTPATIENT)
Dept: SURGERY | Facility: CLINIC | Age: 60
End: 2025-08-12
Payer: COMMERCIAL

## 2025-08-12 VITALS
RESPIRATION RATE: 18 BRPM | SYSTOLIC BLOOD PRESSURE: 117 MMHG | HEART RATE: 83 BPM | BODY MASS INDEX: 21.97 KG/M2 | HEIGHT: 63 IN | OXYGEN SATURATION: 97 % | TEMPERATURE: 98 F | DIASTOLIC BLOOD PRESSURE: 78 MMHG | WEIGHT: 124 LBS

## 2025-08-12 DIAGNOSIS — Z90.13 HISTORY OF BILATERAL MASTECTOMY: ICD-10-CM

## 2025-08-12 DIAGNOSIS — C50.011 PAGET'S CARCINOMA OF THE NIPPLE, RIGHT: Primary | ICD-10-CM

## 2025-08-12 PROCEDURE — 99999 PR PBB SHADOW E&M-EST. PATIENT-LVL III: CPT | Mod: PBBFAC,,, | Performed by: PHYSICIAN ASSISTANT

## 2025-08-12 PROCEDURE — 1160F RVW MEDS BY RX/DR IN RCRD: CPT | Mod: CPTII,S$GLB,, | Performed by: PHYSICIAN ASSISTANT

## 2025-08-12 PROCEDURE — 99214 OFFICE O/P EST MOD 30 MIN: CPT | Mod: S$GLB,,, | Performed by: PHYSICIAN ASSISTANT

## 2025-08-12 PROCEDURE — 3078F DIAST BP <80 MM HG: CPT | Mod: CPTII,S$GLB,, | Performed by: PHYSICIAN ASSISTANT

## 2025-08-12 PROCEDURE — 1159F MED LIST DOCD IN RCRD: CPT | Mod: CPTII,S$GLB,, | Performed by: PHYSICIAN ASSISTANT

## 2025-08-12 PROCEDURE — 3074F SYST BP LT 130 MM HG: CPT | Mod: CPTII,S$GLB,, | Performed by: PHYSICIAN ASSISTANT

## 2025-08-12 PROCEDURE — 3008F BODY MASS INDEX DOCD: CPT | Mod: CPTII,S$GLB,, | Performed by: PHYSICIAN ASSISTANT

## (undated) DEVICE — SUT MCRYL PLUS 3-0 PS2 27IN

## (undated) DEVICE — Device

## (undated) DEVICE — BANDAGE GAUZE COT STRL 4.5X4.1

## (undated) DEVICE — BLANKET HYPER ADULT 24X60IN

## (undated) DEVICE — GLOVE SIGNATURE MICRO LTX 8

## (undated) DEVICE — BLADE SURG STAINLESS STEEL #15

## (undated) DEVICE — SUPPORT ULNA NERVE PROTECTOR

## (undated) DEVICE — GLOVE SENSICARE PI MICRO 6

## (undated) DEVICE — SET FLUID TRANSFER ASEPTIC

## (undated) DEVICE — SUT SILK 2.0 BLK 18

## (undated) DEVICE — NDL SYR 10ML 18X1.5 LL BLUNT

## (undated) DEVICE — ELECTRODE BLADE INSULATED 1 IN

## (undated) DEVICE — SUT 0 VICRYL PLUS CT-1 27IN

## (undated) DEVICE — PAD PREP CUFFED NS 24X48IN

## (undated) DEVICE — STAPLER SKIN PROXIMATE WIDE

## (undated) DEVICE — RETRACTOR TESSA HANDHELD

## (undated) DEVICE — BLANKET WARMING LOWER BODY

## (undated) DEVICE — BLADE SURG STAINLESS STEEL #10

## (undated) DEVICE — SPONGE LAP 18X18 PREWASHED

## (undated) DEVICE — APPLICATOR CHLORAPREP ORN 26ML

## (undated) DEVICE — ELECTRODE PATIENT RETURN DISP

## (undated) DEVICE — RESERVOIR JACKSON-PRATT 100CC

## (undated) DEVICE — PAD ABDOMINAL STERILE 8X10IN

## (undated) DEVICE — DRAPE MEDI-SLUSH 44X44IN

## (undated) DEVICE — PACK SPY-PHI DRUG DRAPE

## (undated) DEVICE — ILLUMINATOR PHOTONBLADE 8/11IN

## (undated) DEVICE — NDL SAFETY 21G X 1 1/2 ECLPSE

## (undated) DEVICE — COUNT NDL FOAM MAGNET 40COUNT

## (undated) DEVICE — SOL NACL IRR 1000ML BTL

## (undated) DEVICE — NDL HYPO REG 25G X 1 1/2

## (undated) DEVICE — STRIP MEDI WND CLSR 1/2X4IN

## (undated) DEVICE — COVER PROBE WITH BAND 6X96IN

## (undated) DEVICE — SUT ETHIBOND 0 CR/MO-7 8-18

## (undated) DEVICE — DRAIN SURG HUBLESS 30CM 15FR

## (undated) DEVICE — ADHESIVE DERMABOND ADVANCED

## (undated) DEVICE — GLOVE 6.0 PROTEXIS PI MICRO

## (undated) DEVICE — SYR IRRIGATION BULB STER 60ML

## (undated) DEVICE — GAUZE DRAIN N WVN 6PLY 4X4IN

## (undated) DEVICE — SUT SILK 3-0 BLK BR SH 30IN

## (undated) DEVICE — BOWL STERILE LG GRAD 32OZ

## (undated) DEVICE — SUT CTD VICRYL 0 UND BR

## (undated) DEVICE — GOWN POLY REINF BRTH SLV XL

## (undated) DEVICE — SOL IRRI STRL WATER 1000ML

## (undated) DEVICE — PROBE TRUNODE GAMMA HAND PIECE

## (undated) DEVICE — BRA MARENA B 38-40 LG BEIGE